# Patient Record
Sex: FEMALE | Race: WHITE | NOT HISPANIC OR LATINO | Employment: OTHER | URBAN - METROPOLITAN AREA
[De-identification: names, ages, dates, MRNs, and addresses within clinical notes are randomized per-mention and may not be internally consistent; named-entity substitution may affect disease eponyms.]

---

## 2023-06-01 ENCOUNTER — TELEPHONE (OUTPATIENT)
Dept: PRIMARY CARE | Facility: CLINIC | Age: 78
End: 2023-06-01
Payer: MEDICARE

## 2023-06-01 NOTE — TELEPHONE ENCOUNTER
Pt called and c/o back of leg hurting and her dtr is a er nurse and told her it could be a blood clot and that she needed a mri. I advised pt to got to ER for evaluation and the er md would order neccessary testing.

## 2023-06-02 ENCOUNTER — TELEPHONE (OUTPATIENT)
Dept: PRIMARY CARE | Facility: CLINIC | Age: 78
End: 2023-06-02
Payer: MEDICARE

## 2023-06-02 NOTE — TELEPHONE ENCOUNTER
Patient was in [facility] [place] yesterday and told to get a 1 week follow up ultrasound of her leg. So she needs an order. Please advise.

## 2023-06-06 ENCOUNTER — OFFICE VISIT (OUTPATIENT)
Dept: PRIMARY CARE | Facility: CLINIC | Age: 78
End: 2023-06-06
Payer: MEDICARE

## 2023-06-06 ENCOUNTER — TELEPHONE (OUTPATIENT)
Dept: PRIMARY CARE | Facility: CLINIC | Age: 78
End: 2023-06-06

## 2023-06-06 VITALS
SYSTOLIC BLOOD PRESSURE: 174 MMHG | TEMPERATURE: 97.9 F | DIASTOLIC BLOOD PRESSURE: 98 MMHG | RESPIRATION RATE: 16 BRPM | HEART RATE: 68 BPM | BODY MASS INDEX: 33.2 KG/M2 | WEIGHT: 170 LBS

## 2023-06-06 DIAGNOSIS — E78.00 HYPERCHOLESTEROLEMIA: ICD-10-CM

## 2023-06-06 DIAGNOSIS — M46.1 SACROILIITIS, NOT ELSEWHERE CLASSIFIED (CMS-HCC): Primary | ICD-10-CM

## 2023-06-06 DIAGNOSIS — I80.01 THROMBOPHLEBITIS OF SUPERFICIAL VEINS OF RIGHT LOWER EXTREMITY: ICD-10-CM

## 2023-06-06 DIAGNOSIS — I10 HYPERTENSION, UNSPECIFIED TYPE: ICD-10-CM

## 2023-06-06 PROBLEM — M51.369 DEGENERATIVE DISC DISEASE, LUMBAR: Status: ACTIVE | Noted: 2023-06-06

## 2023-06-06 PROBLEM — M51.36 DEGENERATIVE DISC DISEASE, LUMBAR: Status: ACTIVE | Noted: 2023-06-06

## 2023-06-06 PROBLEM — J30.2 SEASONAL ALLERGIC RHINITIS: Status: ACTIVE | Noted: 2023-06-06

## 2023-06-06 PROBLEM — M48.061 LUMBAR SPINAL STENOSIS: Status: ACTIVE | Noted: 2023-06-06

## 2023-06-06 PROBLEM — I25.10 ARTERIOSCLEROSIS OF CORONARY ARTERY: Status: ACTIVE | Noted: 2023-06-06

## 2023-06-06 PROBLEM — I83.819 VARICOSE VEINS WITH PAIN: Status: ACTIVE | Noted: 2023-06-06

## 2023-06-06 PROBLEM — I82.811 CHRONIC SUPERFICIAL VENOUS THROMBOSIS OF RIGHT LOWER EXTREMITY: Status: ACTIVE | Noted: 2023-06-01

## 2023-06-06 PROBLEM — I44.7 LBBB (LEFT BUNDLE BRANCH BLOCK): Status: ACTIVE | Noted: 2023-06-06

## 2023-06-06 PROBLEM — J45.909 REACTIVE AIRWAY DISEASE (HHS-HCC): Status: ACTIVE | Noted: 2023-06-06

## 2023-06-06 PROBLEM — K21.9 GASTROESOPHAGEAL REFLUX DISEASE: Status: ACTIVE | Noted: 2023-06-06

## 2023-06-06 PROCEDURE — 1159F MED LIST DOCD IN RCRD: CPT | Performed by: INTERNAL MEDICINE

## 2023-06-06 PROCEDURE — 3080F DIAST BP >= 90 MM HG: CPT | Performed by: INTERNAL MEDICINE

## 2023-06-06 PROCEDURE — 1160F RVW MEDS BY RX/DR IN RCRD: CPT | Performed by: INTERNAL MEDICINE

## 2023-06-06 PROCEDURE — 1036F TOBACCO NON-USER: CPT | Performed by: INTERNAL MEDICINE

## 2023-06-06 PROCEDURE — 3077F SYST BP >= 140 MM HG: CPT | Performed by: INTERNAL MEDICINE

## 2023-06-06 PROCEDURE — 99214 OFFICE O/P EST MOD 30 MIN: CPT | Performed by: INTERNAL MEDICINE

## 2023-06-06 RX ORDER — FLUTICASONE PROPIONATE AND SALMETEROL 100; 50 UG/1; UG/1
1 POWDER RESPIRATORY (INHALATION)
COMMUNITY
Start: 2019-03-29 | End: 2023-08-24 | Stop reason: ALTCHOICE

## 2023-06-06 RX ORDER — ATENOLOL 25 MG/1
25 TABLET ORAL DAILY
COMMUNITY
Start: 2017-01-20

## 2023-06-06 RX ORDER — POTASSIUM CHLORIDE 20 MEQ/1
20 TABLET, EXTENDED RELEASE ORAL DAILY
Qty: 90 TABLET | Refills: 0 | Status: SHIPPED | OUTPATIENT
Start: 2023-06-06 | End: 2023-08-24 | Stop reason: SDUPTHER

## 2023-06-06 RX ORDER — LORATADINE 10 MG/1
10 TABLET ORAL DAILY
COMMUNITY
Start: 2017-01-20

## 2023-06-06 RX ORDER — CHOLECALCIFEROL (VITAMIN D3) 125 MCG
125 CAPSULE ORAL DAILY
COMMUNITY

## 2023-06-06 RX ORDER — POTASSIUM CHLORIDE 1500 MG/1
1 TABLET, EXTENDED RELEASE ORAL DAILY
COMMUNITY
Start: 2012-11-18 | End: 2023-06-06 | Stop reason: SDUPTHER

## 2023-06-06 RX ORDER — HYDROGEN PEROXIDE 3 %
40 SOLUTION, NON-ORAL MISCELLANEOUS
COMMUNITY

## 2023-06-06 RX ORDER — CALCIUM CARBONATE 500(1250)
1 TABLET ORAL
COMMUNITY

## 2023-06-06 RX ORDER — FUROSEMIDE 80 MG/1
40 TABLET ORAL DAILY
COMMUNITY
Start: 2017-01-20 | End: 2024-01-31 | Stop reason: ALTCHOICE

## 2023-06-06 RX ORDER — LOSARTAN POTASSIUM 25 MG/1
1 TABLET ORAL DAILY
COMMUNITY
Start: 2013-05-01

## 2023-06-06 RX ORDER — SIMVASTATIN 20 MG/1
1 TABLET, FILM COATED ORAL NIGHTLY
COMMUNITY
Start: 2013-05-28

## 2023-06-06 RX ORDER — AZELASTINE 1 MG/ML
SPRAY, METERED NASAL 2 TIMES DAILY
COMMUNITY
Start: 2022-02-25 | End: 2023-10-10 | Stop reason: SDUPTHER

## 2023-06-06 RX ORDER — FLUTICASONE PROPIONATE AND SALMETEROL 100; 50 UG/1; UG/1
POWDER RESPIRATORY (INHALATION)
COMMUNITY
Start: 2017-05-10 | End: 2023-08-24 | Stop reason: ALTCHOICE

## 2023-06-06 RX ORDER — GABAPENTIN 100 MG/1
300 CAPSULE ORAL 2 TIMES DAILY
COMMUNITY
Start: 2013-05-09 | End: 2024-01-24 | Stop reason: SDUPTHER

## 2023-06-06 RX ORDER — ECHINACEA PURPUREA EXTRACT 125 MG
TABLET ORAL
COMMUNITY

## 2023-06-06 RX ORDER — OMEGA-3-ACID ETHYL ESTERS 1 G/1
1 CAPSULE, LIQUID FILLED ORAL 2 TIMES DAILY
COMMUNITY

## 2023-06-06 RX ORDER — BUTYROSPERMUM PARKII(SHEA BUTTER), SIMMONDSIA CHINENSIS (JOJOBA) SEED OIL, ALOE BARBADENSIS LEAF EXTRACT .01; 1; 3.5 G/100G; G/100G; G/100G
250 LIQUID TOPICAL 2 TIMES DAILY
COMMUNITY
End: 2023-11-15 | Stop reason: ALTCHOICE

## 2023-06-07 NOTE — TELEPHONE ENCOUNTER
Vascular lab called to report scan was done on leg and there is no changes.  She has a superficial blot.

## 2023-06-09 NOTE — TELEPHONE ENCOUNTER
Spoke to pt and aware of preliminary report and to call for ortho referral if pain continues.  josias

## 2023-06-12 DIAGNOSIS — M80.00XA OSTEOPOROSIS WITH CURRENT PATHOLOGICAL FRACTURE, UNSPECIFIED OSTEOPOROSIS TYPE, INITIAL ENCOUNTER: ICD-10-CM

## 2023-08-16 LAB
NON-UH HIE CALCULATED LDL CHOLESTEROL: 73 MG/DL (ref 60–130)
NON-UH HIE CHOLESTEROL: 170 MG/DL (ref 100–200)
NON-UH HIE HDL CHOLESTEROL: 60 MG/DL (ref 40–60)
NON-UH HIE TOTAL CHOL/HDL CHOL RATIO: 2.8
NON-UH HIE TRIGLYCERIDES: 185 MG/DL (ref 30–150)

## 2023-08-21 ENCOUNTER — APPOINTMENT (OUTPATIENT)
Dept: PRIMARY CARE | Facility: CLINIC | Age: 78
End: 2023-08-21
Payer: MEDICARE

## 2023-08-23 ENCOUNTER — TELEPHONE (OUTPATIENT)
Dept: PRIMARY CARE | Facility: CLINIC | Age: 78
End: 2023-08-23
Payer: MEDICARE

## 2023-08-24 ENCOUNTER — OFFICE VISIT (OUTPATIENT)
Dept: PRIMARY CARE | Facility: CLINIC | Age: 78
End: 2023-08-24
Payer: MEDICARE

## 2023-08-24 VITALS
WEIGHT: 168.5 LBS | HEIGHT: 60 IN | TEMPERATURE: 98.8 F | BODY MASS INDEX: 33.08 KG/M2 | RESPIRATION RATE: 16 BRPM | HEART RATE: 68 BPM | DIASTOLIC BLOOD PRESSURE: 70 MMHG | SYSTOLIC BLOOD PRESSURE: 130 MMHG

## 2023-08-24 DIAGNOSIS — B37.9 CANDIDIASIS: ICD-10-CM

## 2023-08-24 DIAGNOSIS — I25.10 ARTERIOSCLEROSIS OF CORONARY ARTERY: ICD-10-CM

## 2023-08-24 DIAGNOSIS — I10 PRIMARY HYPERTENSION: Primary | ICD-10-CM

## 2023-08-24 DIAGNOSIS — M46.1 SACROILIITIS, NOT ELSEWHERE CLASSIFIED (CMS-HCC): ICD-10-CM

## 2023-08-24 DIAGNOSIS — I70.0 ATHEROSCLEROSIS OF AORTA (CMS-HCC): ICD-10-CM

## 2023-08-24 DIAGNOSIS — E78.00 HYPERCHOLESTEROLEMIA: ICD-10-CM

## 2023-08-24 DIAGNOSIS — I10 BENIGN ESSENTIAL HYPERTENSION: ICD-10-CM

## 2023-08-24 DIAGNOSIS — I10 HYPERTENSION, UNSPECIFIED TYPE: ICD-10-CM

## 2023-08-24 PROCEDURE — 3078F DIAST BP <80 MM HG: CPT | Performed by: INTERNAL MEDICINE

## 2023-08-24 PROCEDURE — 3075F SYST BP GE 130 - 139MM HG: CPT | Performed by: INTERNAL MEDICINE

## 2023-08-24 PROCEDURE — 1036F TOBACCO NON-USER: CPT | Performed by: INTERNAL MEDICINE

## 2023-08-24 PROCEDURE — 1159F MED LIST DOCD IN RCRD: CPT | Performed by: INTERNAL MEDICINE

## 2023-08-24 PROCEDURE — 99213 OFFICE O/P EST LOW 20 MIN: CPT | Performed by: INTERNAL MEDICINE

## 2023-08-24 PROCEDURE — 1160F RVW MEDS BY RX/DR IN RCRD: CPT | Performed by: INTERNAL MEDICINE

## 2023-08-24 RX ORDER — BENZONATATE 200 MG/1
200 CAPSULE ORAL 3 TIMES DAILY PRN
COMMUNITY
End: 2024-01-31 | Stop reason: WASHOUT

## 2023-08-24 RX ORDER — NYSTATIN 100000 [USP'U]/G
1 POWDER TOPICAL 2 TIMES DAILY
COMMUNITY
Start: 2018-06-05 | End: 2023-08-24 | Stop reason: SDUPTHER

## 2023-08-24 RX ORDER — CELECOXIB 200 MG/1
200 CAPSULE ORAL DAILY
Qty: 90 CAPSULE | Refills: 0 | Status: SHIPPED | OUTPATIENT
Start: 2023-08-24 | End: 2024-01-31

## 2023-08-24 RX ORDER — CELECOXIB 200 MG/1
1 CAPSULE ORAL DAILY
COMMUNITY
Start: 2015-02-23 | End: 2023-08-24 | Stop reason: SDUPTHER

## 2023-08-24 RX ORDER — POTASSIUM CHLORIDE 20 MEQ/1
20 TABLET, EXTENDED RELEASE ORAL DAILY
Qty: 90 TABLET | Refills: 0 | Status: SHIPPED | OUTPATIENT
Start: 2023-08-24 | End: 2023-12-20 | Stop reason: SDUPTHER

## 2023-08-24 RX ORDER — CLOTRIMAZOLE AND BETAMETHASONE DIPROPIONATE 10; .64 MG/G; MG/G
1 CREAM TOPICAL 2 TIMES DAILY
COMMUNITY
Start: 2017-09-29 | End: 2023-08-24 | Stop reason: SDUPTHER

## 2023-08-24 RX ORDER — FLUTICASONE PROPIONATE AND SALMETEROL 250; 50 UG/1; UG/1
1 POWDER RESPIRATORY (INHALATION) EVERY 12 HOURS
COMMUNITY
Start: 2022-11-03 | End: 2023-10-11 | Stop reason: SDUPTHER

## 2023-08-24 RX ORDER — CLOTRIMAZOLE AND BETAMETHASONE DIPROPIONATE 10; .64 MG/G; MG/G
1 CREAM TOPICAL 2 TIMES DAILY
Qty: 45 G | Refills: 1 | Status: SHIPPED | OUTPATIENT
Start: 2023-08-24

## 2023-08-24 RX ORDER — NYSTATIN 100000 [USP'U]/G
1 POWDER TOPICAL 2 TIMES DAILY
Qty: 60 G | Refills: 1 | Status: SHIPPED | OUTPATIENT
Start: 2023-08-24 | End: 2024-05-23 | Stop reason: SDUPTHER

## 2023-08-24 ASSESSMENT — COLUMBIA-SUICIDE SEVERITY RATING SCALE - C-SSRS
6. HAVE YOU EVER DONE ANYTHING, STARTED TO DO ANYTHING, OR PREPARED TO DO ANYTHING TO END YOUR LIFE?: NO
2. HAVE YOU ACTUALLY HAD ANY THOUGHTS OF KILLING YOURSELF?: NO

## 2023-08-24 ASSESSMENT — PATIENT HEALTH QUESTIONNAIRE - PHQ9
SUM OF ALL RESPONSES TO PHQ9 QUESTIONS 1 AND 2: 0
1. LITTLE INTEREST OR PLEASURE IN DOING THINGS: NOT AT ALL
2. FEELING DOWN, DEPRESSED OR HOPELESS: NOT AT ALL

## 2023-08-24 NOTE — PROGRESS NOTES
Subjective   Patient ID: Nevin Rodney is a 78 y.o. female who presents for Follow-up (cholesterol).  HPI  Patient presents presents today for follow-up of hypertension hyperlipidemia.  She sees cardiology as well for hypertension.  Her last visit her blood pressure was high today is better.  She states when she was at pain management was quite high as well.  She sees Dr. Chun.  She maintains on her routine medications and needs refills on her Celebrex her potassium and her creams for yeast infections.  Patient was in a few months call with a superficial phlebitis posterior to her knee.  She has a little bit of residual hardness in that area but no pain she also has a sore mole on her left shoulder comes and goes today its not sore we examined it nothing is tender so I cannot tell which one is hurting.    Patient denies chest pains headaches dizziness lightheadedness shortness of breath she does not have any change in her lower extremity edema she does get a Lasix prescription from Dr. Chun and she is on gabapentin from her pain management doctor that she sees for degenerative disc disease of her lumbar spine.  Dr. Chun also refills her cholesterol medication.    Review of Systems  Review of systems was performed and is otherwise negative except as noted in HPI.  Objective   /70   Pulse 68   Temp 37.1 °C (98.8 °F)   Resp 16   Ht 1.524 m (5')   Wt 76.4 kg (168 lb 8 oz)   BMI 32.91 kg/m²    Physical Exam  HEENT is normal  Lungs clear bilaterally  Heart is regular rate and rhythm no murmurs  Abdomen benign  Lower extremities no edema  Assessment/Plan   Diagnoses and all orders for this visit:  Primary hypertension  Atherosclerosis of aorta (CMS/HCC)  Arteriosclerosis of coronary artery  Benign essential hypertension  -     Comprehensive Metabolic Panel; Future  -     Lipid Panel; Future  Hypercholesterolemia  -     Comprehensive Metabolic Panel; Future  -     Lipid Panel; Future  Hypertension, unspecified  type  -     potassium chloride CR (Klor-Con M20) 20 mEq ER tablet; Take 1 tablet (20 mEq) by mouth once daily.  Sacroiliitis, not elsewhere classified (CMS/HCC)  -     celecoxib (CeleBREX) 200 mg capsule; Take 1 capsule (200 mg) by mouth once daily.  Candidiasis  -     clotrimazole-betamethasone (Lotrisone) cream; Apply 1 Application topically 2 times a day.  -     nystatin (Mycostatin) 100,000 unit/gram powder; Apply 1 Application topically 2 times a day.    Call with issues  Follow-up with me in 3 months  Work orders done  They will need a lipid panel at this office at this time sure why is she had a CMP order is all however on her CMP in June  So we will just repeat that in November at her next follow-up appointment.  She will call if she has any issues in the meantime refills were sent  Zakiya Burr MD

## 2023-09-15 ENCOUNTER — OFFICE VISIT (OUTPATIENT)
Dept: PRIMARY CARE | Facility: CLINIC | Age: 78
End: 2023-09-15
Payer: MEDICARE

## 2023-09-15 VITALS
TEMPERATURE: 98.3 F | WEIGHT: 165 LBS | SYSTOLIC BLOOD PRESSURE: 132 MMHG | DIASTOLIC BLOOD PRESSURE: 74 MMHG | BODY MASS INDEX: 32.22 KG/M2

## 2023-09-15 DIAGNOSIS — H92.01 DISCOMFORT OF RIGHT EAR: Primary | ICD-10-CM

## 2023-09-15 DIAGNOSIS — Z23 FLU VACCINE NEED: ICD-10-CM

## 2023-09-15 PROCEDURE — 1160F RVW MEDS BY RX/DR IN RCRD: CPT | Performed by: FAMILY MEDICINE

## 2023-09-15 PROCEDURE — 3075F SYST BP GE 130 - 139MM HG: CPT | Performed by: FAMILY MEDICINE

## 2023-09-15 PROCEDURE — G0008 ADMIN INFLUENZA VIRUS VAC: HCPCS | Performed by: FAMILY MEDICINE

## 2023-09-15 PROCEDURE — 90662 IIV NO PRSV INCREASED AG IM: CPT | Performed by: FAMILY MEDICINE

## 2023-09-15 PROCEDURE — 3078F DIAST BP <80 MM HG: CPT | Performed by: FAMILY MEDICINE

## 2023-09-15 PROCEDURE — 99213 OFFICE O/P EST LOW 20 MIN: CPT | Performed by: FAMILY MEDICINE

## 2023-09-15 PROCEDURE — 1036F TOBACCO NON-USER: CPT | Performed by: FAMILY MEDICINE

## 2023-09-15 PROCEDURE — 1159F MED LIST DOCD IN RCRD: CPT | Performed by: FAMILY MEDICINE

## 2023-09-15 NOTE — PROGRESS NOTES
Subjective   Patient ID: Nevin Rodney is a 78 y.o. female who presents for right ear pressure.  HPI  Started a few days ago- right ear pressure, not really pain  H/o asthma- sees Dr. Turner  Takes Claritin daily     Objective   Visit Vitals  /74   Temp 36.8 °C (98.3 °F) (Oral)      Physical Exam  Alert, well-appearing.    HEENT: OP clear. Sclera white. Pupils equal and round. EACs and TMs clear.    Neck: Supple. No palpable masses.     CVS: RRR, no murmurs.     Respiratory: Normal inspirations and expirations. Clear and equal breath sounds.    Assessment/Plan   Diagnoses and all orders for this visit:  Discomfort of right ear    Likely eustacian tube dysfunction  Rec she try Flonase  Call if continued issues     Kristen Velasquez MD  Family Medicine   [facility]

## 2023-10-10 DIAGNOSIS — J30.2 SEASONAL ALLERGIC RHINITIS, UNSPECIFIED TRIGGER: Primary | ICD-10-CM

## 2023-10-10 RX ORDER — AZELASTINE 1 MG/ML
2 SPRAY, METERED NASAL 2 TIMES DAILY
Qty: 72 ML | Refills: 0 | Status: SHIPPED | OUTPATIENT
Start: 2023-10-10 | End: 2023-12-01 | Stop reason: ALTCHOICE

## 2023-10-10 RX ORDER — AZELASTINE 1 MG/ML
1 SPRAY, METERED NASAL 2 TIMES DAILY
Qty: 30 ML | Refills: 0 | Status: CANCELLED | OUTPATIENT
Start: 2023-10-10

## 2023-10-11 DIAGNOSIS — J45.909 ASTHMA, UNSPECIFIED ASTHMA SEVERITY, UNSPECIFIED WHETHER COMPLICATED, UNSPECIFIED WHETHER PERSISTENT (HHS-HCC): Primary | ICD-10-CM

## 2023-10-12 RX ORDER — FLUTICASONE PROPIONATE AND SALMETEROL 250; 50 UG/1; UG/1
1 POWDER RESPIRATORY (INHALATION) EVERY 12 HOURS
Qty: 3 EACH | Refills: 3 | Status: SHIPPED | OUTPATIENT
Start: 2023-10-12 | End: 2024-01-10

## 2023-10-16 LAB
NON-UH HIE A/G RATIO: 1.2
NON-UH HIE ALB: 3.4 G/DL (ref 3.4–5)
NON-UH HIE ALK PHOS: 70 UNIT/L (ref 46–116)
NON-UH HIE BILIRUBIN, TOTAL: 0.6 MG/DL (ref 0.2–1)
NON-UH HIE BUN/CREAT RATIO: 11.2
NON-UH HIE BUN: 9 MG/DL (ref 9–23)
NON-UH HIE CALCIUM: 10.1 MG/DL (ref 8.7–10.4)
NON-UH HIE CALCULATED LDL CHOLESTEROL: 67 MG/DL (ref 60–130)
NON-UH HIE CALCULATED OSMOLALITY: 284 MOSM/KG (ref 275–295)
NON-UH HIE CHLORIDE: 104 MMOL/L (ref 98–107)
NON-UH HIE CHOLESTEROL: 171 MG/DL (ref 100–200)
NON-UH HIE CO2, VENOUS: 33 MMOL/L (ref 20–31)
NON-UH HIE CREATININE: 0.8 MG/DL (ref 0.5–0.8)
NON-UH HIE GFR AA: >60
NON-UH HIE GLOBULIN: 2.8 G/DL
NON-UH HIE GLOMERULAR FILTRATION RATE: >60 ML/MIN/1.73M?
NON-UH HIE GLUCOSE: 100 MG/DL (ref 74–106)
NON-UH HIE GOT: 22 UNIT/L (ref 15–37)
NON-UH HIE GPT: 19 UNIT/L (ref 10–49)
NON-UH HIE HDL CHOLESTEROL: 68 MG/DL (ref 40–60)
NON-UH HIE K: 4.6 MMOL/L (ref 3.5–5.1)
NON-UH HIE NA: 143 MMOL/L (ref 135–145)
NON-UH HIE TOTAL CHOL/HDL CHOL RATIO: 2.5
NON-UH HIE TOTAL PROTEIN: 6.2 G/DL (ref 5.7–8.2)
NON-UH HIE TRIGLYCERIDES: 178 MG/DL (ref 30–150)

## 2023-11-10 DIAGNOSIS — Z12.11 COLON CANCER SCREENING: ICD-10-CM

## 2023-11-14 ASSESSMENT — ENCOUNTER SYMPTOMS
SINUS PRESSURE: 0
AGITATION: 0
DYSPHORIC MOOD: 0
SHORTNESS OF BREATH: 0
RHINORRHEA: 0
STRIDOR: 0
SORE THROAT: 0
CHEST TIGHTNESS: 0
CONFUSION: 0
SLEEP DISTURBANCE: 0
WHEEZING: 0
CONSTITUTIONAL NEGATIVE: 1
COUGH: 0
PALPITATIONS: 0
SINUS PAIN: 0

## 2023-11-15 ENCOUNTER — OFFICE VISIT (OUTPATIENT)
Dept: ALLERGY | Facility: CLINIC | Age: 78
End: 2023-11-15
Payer: MEDICARE

## 2023-11-15 VITALS — WEIGHT: 168 LBS | BODY MASS INDEX: 32.81 KG/M2 | OXYGEN SATURATION: 96 %

## 2023-11-15 DIAGNOSIS — J45.20 MILD INTERMITTENT REACTIVE AIRWAY DISEASE WITHOUT COMPLICATION (HHS-HCC): ICD-10-CM

## 2023-11-15 DIAGNOSIS — J45.909 ASTHMA, UNSPECIFIED ASTHMA SEVERITY, UNSPECIFIED WHETHER COMPLICATED, UNSPECIFIED WHETHER PERSISTENT (HHS-HCC): ICD-10-CM

## 2023-11-15 DIAGNOSIS — J30.2 SEASONAL ALLERGIC RHINITIS DUE TO FUNGAL SPORES: Primary | ICD-10-CM

## 2023-11-15 PROCEDURE — 94375 RESPIRATORY FLOW VOLUME LOOP: CPT | Performed by: ALLERGY & IMMUNOLOGY

## 2023-11-15 PROCEDURE — 96160 PT-FOCUSED HLTH RISK ASSMT: CPT | Performed by: ALLERGY & IMMUNOLOGY

## 2023-11-15 PROCEDURE — 1159F MED LIST DOCD IN RCRD: CPT | Performed by: ALLERGY & IMMUNOLOGY

## 2023-11-15 PROCEDURE — 1036F TOBACCO NON-USER: CPT | Performed by: ALLERGY & IMMUNOLOGY

## 2023-11-15 PROCEDURE — 99214 OFFICE O/P EST MOD 30 MIN: CPT | Performed by: ALLERGY & IMMUNOLOGY

## 2023-11-15 PROCEDURE — 1160F RVW MEDS BY RX/DR IN RCRD: CPT | Performed by: ALLERGY & IMMUNOLOGY

## 2023-11-15 RX ORDER — ALBUTEROL SULFATE 90 UG/1
1-2 AEROSOL, METERED RESPIRATORY (INHALATION) EVERY 4 HOURS PRN
Qty: 8.5 G | Refills: 0 | Status: SHIPPED | OUTPATIENT
Start: 2023-11-15 | End: 2024-11-14

## 2023-11-15 RX ORDER — FLUTICASONE PROPIONATE 50 MCG
2 SPRAY, SUSPENSION (ML) NASAL DAILY PRN
COMMUNITY

## 2023-11-15 ASSESSMENT — ENCOUNTER SYMPTOMS: ROS GI COMMENTS: REFLUX.

## 2023-11-15 NOTE — PROGRESS NOTES
"    Subjective   Patient ID:   [id number]   Kaity Rodney \"Nevin\" is a 78 y.o. female who presents for Allergies and Asthma.    Chief Complaint   Patient presents with    Allergies    Asthma        Since last visit, 08/10/2023, patient reports she has been well overall.  She noticed cough resolved after stopping Spiriva.  She uses her inhaler BID every day.  She has not used albuterol.  She has azelastine and uses Flonase PRN.     Patient provided a sputum culture that was negative.      Patient received her RSV vaccine and then her influenza vaccines.  She is not going to have the COVID because despite 5 vaccines, she had COVID.    Patient does endorse heartburn more than 3x a week but takes no medication for it. (Med list shows esomeprazole)     Review of Systems   Constitutional: Negative.    HENT:  Negative for ear discharge, ear pain, rhinorrhea, sinus pressure, sinus pain, sneezing and sore throat.    Respiratory:  Negative for cough, chest tightness, shortness of breath, wheezing and stridor.    Cardiovascular:  Negative for chest pain, palpitations and leg swelling.   Gastrointestinal:         Reflux.   Skin:  Negative for rash.   Allergic/Immunologic: Positive for environmental allergies. Negative for food allergies and immunocompromised state.   Psychiatric/Behavioral:  Negative for agitation, confusion, dysphoric mood and sleep disturbance.          Objective     Wt 76.2 kg (168 lb)   SpO2 96%   BMI 32.81 kg/m²      Physical Exam  Constitutional:       Appearance: Normal appearance.   HENT:      Head: Normocephalic and atraumatic.      Right Ear: External ear normal. There is no impacted cerumen.      Left Ear: External ear normal. There is no impacted cerumen.      Nose: Congestion present. No rhinorrhea.   Eyes:      Extraocular Movements: Extraocular movements intact.      Conjunctiva/sclera: Conjunctivae normal.      Pupils: Pupils are equal, round, and reactive to light.   Cardiovascular:      " ANTICOAGULATION MANAGEMENT      Salome Saeed due for annual renewal of referral to anticoagulation monitoring. Order pended for your review and signature.      ANTICOAGULATION SUMMARY      Warfarin indication(s)     CVA    Heart valve present?  NO       Current goal range   INR: 2.0-3.0     Goal appropriate for indication? Yes, INR 2-3 appropriate for hx of DVT, PE, hypercoagulable state, Afib, LVAD, or bileaflet AVR without risk factors     Current duration of therapy Indefinite/long term therapy   Time in Therapeutic Range (TTR)  (Goal > 60%) 79.7%       Office visit with referring provider's group within last year yes on 4/9/21       Neel Winn RN       Rate and Rhythm: Normal rate and regular rhythm.      Heart sounds: No murmur heard.     No friction rub. No gallop.   Pulmonary:      Effort: No respiratory distress.      Breath sounds: No wheezing, rhonchi or rales.   Skin:     General: Skin is warm and dry.   Neurological:      Mental Status: She is alert.   Psychiatric:         Mood and Affect: Mood normal.         Behavior: Behavior normal.       FVC:   89    FEV1:   104    FEV1/FVC:  116    FEF 25-75:    167    ACT is 16.       Current Outpatient Medications   Medication Sig Dispense Refill    atenolol (Tenormin) 25 mg tablet 1 tablet (25 mg) once daily.      azelastine (Astelin) 137 mcg (0.1 %) nasal spray Administer 2 sprays into each nostril 2 times a day. 72 mL 0    benzonatate (Tessalon) 200 mg capsule Take 1 capsule (200 mg) by mouth 3 times a day as needed for cough. Do not crush or chew.      calcium 500 mg calcium (1,250 mg) tablet Take 1 tablet (1,250 mg) by mouth 2 times a day with meals.      celecoxib (CeleBREX) 200 mg capsule Take 1 capsule (200 mg) by mouth once daily. 90 capsule 0    cholecalciferol (Vitamin D-3) 125 MCG (5000 UT) capsule Take 1 capsule (125 mcg) by mouth once daily.      clotrimazole-betamethasone (Lotrisone) cream Apply 1 Application topically 2 times a day. 45 g 1    Echinacea purpurea extract (echinacea) 125 mg tablet Take by mouth.      esomeprazole (NexIUM) 20 mg DR capsule Take 2 capsules (40 mg) by mouth once daily in the morning. Take before meals. Do not open capsule.      fluticasone propion-salmeteroL (Advair Diskus) 250-50 mcg/dose diskus inhaler Inhale 1 puff every 12 hours. 3 each 3    furosemide (Lasix) 80 mg tablet 0.5 tablets (40 mg) once daily.      gabapentin (Neurontin) 100 mg capsule Take 3 capsules (300 mg) by mouth 2 times a day.      loratadine (Claritin) 10 mg tablet Take 1 tablet (10 mg) by mouth once daily.      losartan (Cozaar) 25 mg tablet Take 1 tablet (25 mg) by mouth once daily.      nystatin  (Mycostatin) 100,000 unit/gram powder Apply 1 Application topically 2 times a day. 60 g 1    omega-3 acid ethyl esters (Lovaza) 1 gram capsule Take 1 capsule (1 g) by mouth 2 times a day.      potassium chloride CR (Klor-Con M20) 20 mEq ER tablet Take 1 tablet (20 mEq) by mouth once daily. 90 tablet 0    simvastatin (Zocor) 20 mg tablet Take 1 tablet (20 mg) by mouth once daily at bedtime.      albuterol (ProAir HFA) 90 mcg/actuation inhaler Inhale 1-2 puffs every 4 hours if needed for wheezing or shortness of breath (cough). 8.5 g 0    fluticasone (Flonase) 50 mcg/actuation nasal spray Administer 2 sprays into each nostril once daily as needed for rhinitis (ear congestion). Shake gently. Before first use, prime pump. After use, clean tip and replace cap.       No current facility-administered medications for this visit.         Orders Placed This Encounter   Procedures    Pulmonary function testing     Order Specific Question:   Reason for Exam:     Answer:   asthma     Order Specific Question:   Testing Type:     Answer:   Spirometry     Order Specific Question:   Liters of Oxygen per minute:     Answer:   N/A          Assessment/Plan         Asthma  ACT is 16.  IO PFT looks great today.  She will use albuterol as needed and continue Advair.       By signing my name below, I, Mandeep Garcia, attest that this documentation has been prepared under the direction and in the presence of Evelyn Turner MD.   All medical record entries made by the Scribe were at my direction and personally dictated by me. I have reviewed the chart and agree that the record accurately reflects my personal performance of the history, physical exam, discussion and plan.

## 2023-11-20 LAB
FEV1/FVC: 116 %
FEV1: 104 LITERS
FVC: 89 LITERS

## 2023-12-01 ENCOUNTER — OFFICE VISIT (OUTPATIENT)
Dept: PRIMARY CARE | Facility: CLINIC | Age: 78
End: 2023-12-01
Payer: MEDICARE

## 2023-12-01 VITALS
BODY MASS INDEX: 32.79 KG/M2 | SYSTOLIC BLOOD PRESSURE: 132 MMHG | WEIGHT: 167 LBS | OXYGEN SATURATION: 94 % | HEIGHT: 60 IN | HEART RATE: 71 BPM | TEMPERATURE: 98 F | DIASTOLIC BLOOD PRESSURE: 76 MMHG

## 2023-12-01 DIAGNOSIS — I10 PRIMARY HYPERTENSION: ICD-10-CM

## 2023-12-01 DIAGNOSIS — E78.00 HYPERCHOLESTEROLEMIA: ICD-10-CM

## 2023-12-01 DIAGNOSIS — I25.10 ARTERIOSCLEROSIS OF CORONARY ARTERY: ICD-10-CM

## 2023-12-01 DIAGNOSIS — I10 BENIGN ESSENTIAL HYPERTENSION: Primary | ICD-10-CM

## 2023-12-01 DIAGNOSIS — J45.20 MILD INTERMITTENT ASTHMA WITHOUT COMPLICATION (HHS-HCC): ICD-10-CM

## 2023-12-01 PROCEDURE — 1160F RVW MEDS BY RX/DR IN RCRD: CPT | Performed by: INTERNAL MEDICINE

## 2023-12-01 PROCEDURE — 3075F SYST BP GE 130 - 139MM HG: CPT | Performed by: INTERNAL MEDICINE

## 2023-12-01 PROCEDURE — 3078F DIAST BP <80 MM HG: CPT | Performed by: INTERNAL MEDICINE

## 2023-12-01 PROCEDURE — 1159F MED LIST DOCD IN RCRD: CPT | Performed by: INTERNAL MEDICINE

## 2023-12-01 PROCEDURE — 99213 OFFICE O/P EST LOW 20 MIN: CPT | Performed by: INTERNAL MEDICINE

## 2023-12-01 PROCEDURE — 1036F TOBACCO NON-USER: CPT | Performed by: INTERNAL MEDICINE

## 2023-12-01 RX ORDER — AZELASTINE 1 MG/ML
1 SPRAY, METERED NASAL 2 TIMES DAILY
COMMUNITY
End: 2024-01-02 | Stop reason: SDUPTHER

## 2023-12-01 ASSESSMENT — PATIENT HEALTH QUESTIONNAIRE - PHQ9
2. FEELING DOWN, DEPRESSED OR HOPELESS: NOT AT ALL
1. LITTLE INTEREST OR PLEASURE IN DOING THINGS: NOT AT ALL
SUM OF ALL RESPONSES TO PHQ9 QUESTIONS 1 AND 2: 0

## 2023-12-01 NOTE — PROGRESS NOTES
"Subjective   Patient ID: Kaity Rodney \"Nevin\" is a 78 y.o. female who presents for Follow-up (EP.  Follow up.  Labs done in October.  No concerns.).  HPI  Patient presents today for routine follow-up.  We reviewed her medication list and it is accurate as noted.  She still sees her cardiologist  and she is currently seeing pain management due to a flare of her sciatica asked after her recent river cruise.  She is getting some injections next week.  Patient denies chest pains headaches dizziness lightheadedness or shortness of breath she does not have any lower extremity edema.  She has a chronic cough that is treated by her allergist and asthma doctor.  She was recently given albuterol to use as needed.  She uses Advair.  She uses Tessalon Perles but they do not really help she is occasional cough syrup.  She uses azelastine nasal spray as well as Flonase.  She continues on her routine pain medications.  She is on her routine blood pressure medications she had a CMP and lipid panel done which we reviewed here in the office today.  She will be due for Medicare wellness exam in 6 months.  Patient does not have any other issues currently she does try to get her back pain under control so she can get more exercise.    Review of Systems  Review of systems was performed and is otherwise negative except as noted in HPI.  Objective   /76   Pulse 71   Temp 36.7 °C (98 °F) (Oral)   Ht 1.524 m (5')   Wt 75.8 kg (167 lb)   SpO2 94%   BMI 32.61 kg/m²    Physical Exam  HEENT is normal  Lungs clear bilaterally  Heart is regular rate rhythm no murmurs  Abdomen benign  Lower extremities no edema    Assessment/Plan   Diagnoses and all orders for this visit:  Benign essential hypertension  -     CBC and Auto Differential; Future  -     Comprehensive Metabolic Panel; Future  -     Lipid Panel; Future  -     TSH with reflex to Free T4 if abnormal; Future  Arteriosclerosis of coronary artery  -     CBC and Auto " Differential; Future  -     Comprehensive Metabolic Panel; Future  -     Lipid Panel; Future  -     TSH with reflex to Free T4 if abnormal; Future  Primary hypertension  -     CBC and Auto Differential; Future  -     Comprehensive Metabolic Panel; Future  -     Lipid Panel; Future  -     TSH with reflex to Free T4 if abnormal; Future  Hypercholesterolemia  -     CBC and Auto Differential; Future  -     Comprehensive Metabolic Panel; Future  -     Lipid Panel; Future  -     TSH with reflex to Free T4 if abnormal; Future  Mild intermittent asthma without complication    Call with issues  Follow-up with me in 6 months  Continue routine medications  Continue to work on diet and exercise  Zakiya Burr MD

## 2023-12-19 DIAGNOSIS — I10 HYPERTENSION, UNSPECIFIED TYPE: ICD-10-CM

## 2023-12-19 NOTE — TELEPHONE ENCOUNTER
Patient ldm on machine, needs refill on  potassium chloride CR (Klor-Con M20) 20   Send to [facility]  Last appt. 12/1/23  Upcoming appt. 6/4/24

## 2023-12-20 RX ORDER — POTASSIUM CHLORIDE 20 MEQ/1
20 TABLET, EXTENDED RELEASE ORAL DAILY
Qty: 90 TABLET | Refills: 1 | Status: SHIPPED | OUTPATIENT
Start: 2023-12-20

## 2024-01-02 DIAGNOSIS — J30.2 SEASONAL ALLERGIC RHINITIS, UNSPECIFIED TRIGGER: Primary | ICD-10-CM

## 2024-01-02 RX ORDER — AZELASTINE 1 MG/ML
1 SPRAY, METERED NASAL 2 TIMES DAILY
Qty: 90 ML | Refills: 1 | Status: SHIPPED | OUTPATIENT
Start: 2024-01-02 | End: 2024-03-20 | Stop reason: SDUPTHER

## 2024-01-24 PROBLEM — I10 HYPERTENSION: Status: RESOLVED | Noted: 2023-06-06 | Resolved: 2024-01-24

## 2024-01-24 RX ORDER — GABAPENTIN 300 MG/1
300 CAPSULE ORAL 2 TIMES DAILY
COMMUNITY
Start: 2023-02-27

## 2024-01-31 ENCOUNTER — OFFICE VISIT (OUTPATIENT)
Dept: RHEUMATOLOGY | Facility: CLINIC | Age: 79
End: 2024-01-31
Payer: MEDICARE

## 2024-01-31 VITALS
HEART RATE: 69 BPM | BODY MASS INDEX: 31.6 KG/M2 | DIASTOLIC BLOOD PRESSURE: 86 MMHG | OXYGEN SATURATION: 97 % | SYSTOLIC BLOOD PRESSURE: 141 MMHG | TEMPERATURE: 97.9 F | WEIGHT: 161.8 LBS

## 2024-01-31 DIAGNOSIS — M71.21 BAKER'S CYST OF KNEE, RIGHT: Primary | ICD-10-CM

## 2024-01-31 DIAGNOSIS — M46.1 SACROILIITIS, NOT ELSEWHERE CLASSIFIED (CMS-HCC): ICD-10-CM

## 2024-01-31 DIAGNOSIS — I70.0 ATHEROSCLEROSIS OF AORTA (CMS-HCC): ICD-10-CM

## 2024-01-31 DIAGNOSIS — M15.9 PRIMARY OSTEOARTHRITIS INVOLVING MULTIPLE JOINTS: ICD-10-CM

## 2024-01-31 PROBLEM — M15.0 PRIMARY OSTEOARTHRITIS INVOLVING MULTIPLE JOINTS: Status: ACTIVE | Noted: 2024-01-31

## 2024-01-31 PROCEDURE — 99204 OFFICE O/P NEW MOD 45 MIN: CPT | Performed by: INTERNAL MEDICINE

## 2024-01-31 PROCEDURE — 3079F DIAST BP 80-89 MM HG: CPT | Performed by: INTERNAL MEDICINE

## 2024-01-31 PROCEDURE — 1159F MED LIST DOCD IN RCRD: CPT | Performed by: INTERNAL MEDICINE

## 2024-01-31 PROCEDURE — 1160F RVW MEDS BY RX/DR IN RCRD: CPT | Performed by: INTERNAL MEDICINE

## 2024-01-31 PROCEDURE — 3077F SYST BP >= 140 MM HG: CPT | Performed by: INTERNAL MEDICINE

## 2024-01-31 PROCEDURE — 1036F TOBACCO NON-USER: CPT | Performed by: INTERNAL MEDICINE

## 2024-01-31 PROCEDURE — 20610 DRAIN/INJ JOINT/BURSA W/O US: CPT | Performed by: INTERNAL MEDICINE

## 2024-01-31 RX ORDER — FUROSEMIDE 40 MG/1
20 TABLET ORAL DAILY
COMMUNITY
Start: 2024-01-15 | End: 2024-05-16 | Stop reason: ALTCHOICE

## 2024-01-31 RX ORDER — TRIAMCINOLONE ACETONIDE 40 MG/ML
40 INJECTION, SUSPENSION INTRA-ARTICULAR; INTRAMUSCULAR
Status: COMPLETED | OUTPATIENT
Start: 2024-01-31 | End: 2024-01-31

## 2024-01-31 RX ORDER — MELOXICAM 15 MG/1
15 TABLET ORAL DAILY
Qty: 30 TABLET | Refills: 11 | Status: SHIPPED | OUTPATIENT
Start: 2024-01-31 | End: 2024-02-23 | Stop reason: SINTOL

## 2024-01-31 RX ADMIN — TRIAMCINOLONE ACETONIDE 40 MG: 40 INJECTION, SUSPENSION INTRA-ARTICULAR; INTRAMUSCULAR at 16:48

## 2024-01-31 ASSESSMENT — ENCOUNTER SYMPTOMS
TROUBLE SWALLOWING: 0
FEVER: 0
WHEEZING: 0
BACK PAIN: 1
UNEXPECTED WEIGHT CHANGE: 0
COUGH: 1
DYSPHORIC MOOD: 0
NUMBNESS: 1
HEADACHES: 1
AGITATION: 1
POLYDIPSIA: 0
SORE THROAT: 0
BRUISES/BLEEDS EASILY: 0
DIZZINESS: 0
CHILLS: 0
HEMATURIA: 0
NAUSEA: 0
DIFFICULTY URINATING: 0
JOINT SWELLING: 1
ARTHRALGIAS: 1
EYE DISCHARGE: 0
EYE ITCHING: 1
EYE REDNESS: 0
MYALGIAS: 0
FATIGUE: 0
ADENOPATHY: 0
CONSTIPATION: 0
CHEST TIGHTNESS: 0
NERVOUS/ANXIOUS: 0
COLOR CHANGE: 0
PALPITATIONS: 0
DIARRHEA: 0
DYSURIA: 0
VOMITING: 0
SHORTNESS OF BREATH: 0
ABDOMINAL PAIN: 0
SLEEP DISTURBANCE: 0
WEAKNESS: 0
PHOTOPHOBIA: 0
EYE PAIN: 0

## 2024-01-31 ASSESSMENT — PATIENT HEALTH QUESTIONNAIRE - PHQ9
2. FEELING DOWN, DEPRESSED OR HOPELESS: NOT AT ALL
SUM OF ALL RESPONSES TO PHQ9 QUESTIONS 1 & 2: 0
1. LITTLE INTEREST OR PLEASURE IN DOING THINGS: NOT AT ALL

## 2024-01-31 NOTE — ASSESSMENT & PLAN NOTE
Chronic Condition Documentation: Stable based on symptoms and exam. Continue      established treatment plan and follow-up at least yearly.  Monitored by PCP

## 2024-01-31 NOTE — LETTER
January 31, 2024     Zakiya Burr MD  [address]  [facility]  [facility] [address]    Patient: Nevin Rodney   YOB: 1945   Date of Visit: 1/31/2024       Dear Dr. Zakiya Burr MD:    Thank you for referring Nevin Rodney to me for evaluation. Below are my notes for this consultation.  If you have questions, please do not hesitate to call me. I look forward to following your patient along with you.       Sincerely,     Clarissa Sherman MD      CC: No Recipients  ______________________________________________________________________________________    Rheumatology Consultation note  PCP:  A[facility]    See scanned history form  Chief Complaint   Patient presents with   • Establish Care   • Joint Pain       SUBJECTIVE  Pt reports she developed a lump in the back of her knee and was dx with a Baker's cyst.   It still gives her trouble and wonders what can be done. She has been on celebrex for years for arthritis--doesn't know if it works as well as it did before    Pt does have long standing issues with back.  Sees pain management.   Injections help        Patient Active Problem List    Diagnosis Date Noted   • Baker's cyst of knee, right 01/31/2024   • Primary osteoarthritis involving multiple joints 01/31/2024   • Atherosclerosis of aorta (CMS/HCC) 08/24/2023   • Sacroiliitis, not elsewhere classified (CMS/HCC) 06/06/2023   • Arteriosclerosis of coronary artery 06/06/2023   • Benign essential hypertension 06/06/2023   • Degenerative disc disease, lumbar 06/06/2023   • Gastroesophageal reflux disease 06/06/2023   • Hypercholesterolemia 06/06/2023   • LBBB (left bundle branch block) 06/06/2023   • Lumbar spinal stenosis 06/06/2023   • Asthma 06/06/2023   • Seasonal allergic rhinitis 06/06/2023   • Varicose veins with pain 06/06/2023   • 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 06/01/2023     Past Medical History:   Diagnosis Date   • Arthralgia of  temporomandibular joint, unspecified side 08/20/2015    TMJ arthralgia   • Consumes 2 to 3 servings of caffeine per day    • Eczema 10/27/2014    History of eczema   • Gastric ulcer     History of gastric ulcer   • Spondylosis without myelopathy or radiculopathy, cervical region 04/26/2014    DJD (degenerative joint disease) of cervical spine   • Unspecified abdominal hernia without obstruction or gangrene     Hernia     Past Surgical History:   Procedure Laterality Date   • CARDIAC CATHETERIZATION  02/25/2022    Cardiac catheterization   • CARPAL TUNNEL RELEASE  02/25/2022    Carpal tunnel surgery   • COLONOSCOPY  04/02/2014    Complete Colonoscopy   • HERNIA REPAIR  11/08/2016    Hernia Repair   • INCISIONAL BREAST BIOPSY  04/02/2014    Incisional Breast Biopsy       Current Outpatient Medications   Medication Instructions   • albuterol (ProAir HFA) 90 mcg/actuation inhaler 1-2 puffs, inhalation, Every 4 hours PRN   • atenolol (TENORMIN) 25 mg, Daily   • azelastine (Astelin) 137 mcg (0.1 %) nasal spray 1 spray, Each Nostril, 2 times daily, Use in each nostril as directed   • calcium 500 mg calcium (1,250 mg) tablet 1 tablet, oral, 2 times daily with meals   • cholecalciferol (VITAMIN D-3) 125 mcg, oral, Daily   • clotrimazole-betamethasone (Lotrisone) cream 1 Application, Topical, 2 times daily   • Echinacea purpurea extract (echinacea) 125 mg tablet oral   • esomeprazole (NEXIUM) 40 mg, oral, Daily before breakfast, Do not open capsule.   • fluticasone (Flonase) 50 mcg/actuation nasal spray 2 sprays, Each Nostril, Daily PRN, Shake gently. Before first use, prime pump. After use, clean tip and replace cap.   • fluticasone propion-salmeteroL (Advair Diskus) 250-50 mcg/dose diskus inhaler 1 puff, inhalation, Every 12 hours   • furosemide (LASIX) 20 mg, oral, Daily   • gabapentin (NEURONTIN) 300 mg, oral, 2 times daily   • loratadine (CLARITIN) 10 mg, oral, Daily   • losartan (Cozaar) 25 mg tablet 1 tablet, oral,  Daily   • meloxicam (MOBIC) 15 mg, oral, Daily   • nystatin (Mycostatin) 100,000 unit/gram powder 1 Application, Topical, 2 times daily   • omega-3 acid ethyl esters (LOVAZA) 1 g, oral, 2 times daily   • potassium chloride CR 20 mEq ER tablet 20 mEq, oral, Daily   • simvastatin (Zocor) 20 mg tablet 1 tablet, oral, Nightly     Allergies   Allergen Reactions   • Darvon [Propoxyphene] Hallucinations   • Amoxil [Amoxicillin] Rash     Review of Systems   Constitutional:  Negative for chills, fatigue, fever and unexpected weight change.   HENT:  Negative for congestion, hearing loss, mouth sores, nosebleeds, postnasal drip, sneezing, sore throat, tinnitus and trouble swallowing.         No dry eye and dry mouth   Eyes:  Positive for itching. Negative for photophobia, pain, discharge, redness and visual disturbance.        Eye dryness   Respiratory:  Positive for cough. Negative for chest tightness, shortness of breath and wheezing.    Cardiovascular:  Negative for chest pain, palpitations and leg swelling.   Gastrointestinal:  Negative for abdominal pain, constipation, diarrhea, nausea and vomiting.   Endocrine: Negative for polydipsia and polyuria.   Genitourinary:  Negative for difficulty urinating, dysuria and hematuria.   Musculoskeletal:  Positive for arthralgias, back pain and joint swelling. Negative for gait problem and myalgias.   Skin:  Negative for color change and rash.   Allergic/Immunologic: Negative for immunocompromised state.   Neurological:  Positive for numbness and headaches. Negative for dizziness and weakness.   Hematological:  Negative for adenopathy. Does not bruise/bleed easily.   Psychiatric/Behavioral:  Positive for agitation. Negative for dysphoric mood and sleep disturbance. The patient is not nervous/anxious.        PHYSICAL EXAM  /86 (BP Location: Left arm, Patient Position: Sitting, BP Cuff Size: Adult)   Pulse 69   Temp 36.6 °C (97.9 °F) (Temporal)   Wt 73.4 kg (161 lb 12.8 oz)    "SpO2 97%   BMI 31.60 kg/m²     Physical Exam  Vitals reviewed.   Constitutional:       General: She is not in acute distress.     Appearance: Normal appearance.   HENT:      Head: Normocephalic and atraumatic.   Eyes:      Conjunctiva/sclera: Conjunctivae normal.   Pulmonary:      Effort: Pulmonary effort is normal. No respiratory distress.   Musculoskeletal:         General: Swelling and tenderness present. No deformity.      Cervical back: Normal range of motion.      Right lower leg: No edema.      Left lower leg: No edema.      Comments: No synovitis of hand joints.  +marked scoliosis  R knee with palpable small baker's cyst.  L shoulder with pain and pop on ROM   Skin:     Coloration: Skin is not pale.      Findings: No erythema or rash.   Neurological:      General: No focal deficit present.      Mental Status: She is alert and oriented to person, place, and time. Mental status is at baseline.      Gait: Gait normal.   Psychiatric:         Mood and Affect: Mood normal.       Patient ID: Kaity Rodney \"Nevin\" is a 78 y.o. female.    Large Joint Injection/Arthrocentesis: R knee on 1/31/2024 4:48 PM  Indications: pain and joint swelling (Baker cyst)  Details: 25 G needle, medial approach  Medications: 40 mg triamcinolone acetonide 40 mg/mL  Outcome: tolerated well, no immediate complications  Procedure, treatment alternatives, risks and benefits explained, specific risks discussed. Consent was given by the patient. Immediately prior to procedure a time out was called to verify the correct patient, procedure, equipment, support staff and site/side marked as required. Patient was prepped and draped in the usual sterile fashion.         Assessment/plan  Problem List Items Addressed This Visit       Sacroiliitis, not elsewhere classified (CMS/HCC)    Current Assessment & Plan     Chronic Condition Documentation: Stable based on symptoms and exam. Continue      established treatment plan and follow-up at least " yearly.  Sees pain management         Atherosclerosis of aorta (CMS/[facility])    Current Assessment & Plan     Chronic Condition Documentation: Stable based on symptoms and exam. Continue      established treatment plan and follow-up at least yearly.  Monitored by PCP         Morales's cyst of knee, right - Primary    Current Assessment & Plan     Pt with Chronic Baker's cyst that still bothers her.  Injection done for relief of symptoms          Primary osteoarthritis involving multiple joints    Current Assessment & Plan     Will change NSAID to meloxicam         Relevant Medications    meloxicam (Mobic) 15 mg tablet     Follow up: as needed.  If meloxicam helpful, PCP can prescribe.  Follow up if another knee injection needed for Baker's cyst    The patient's labs, radiology images and reports and other tests done prior to appointment were obtained, reviewed and summarized as applicable from the physician portal, EHR symptoms and/or outside sources.  Pertinent positive and negative findings were considered in medical decision making.  Old records were reviewed and further were requested from previous physicians  All questions were answered and patient was counseled regarding diagnosis, prognosis, risks and benefits of various treatment options and importance of compliance with therapy

## 2024-01-31 NOTE — PROGRESS NOTES
Rheumatology Consultation note  PCP:  Aminah    See scanned history form  Chief Complaint   Patient presents with    Establish Care    Joint Pain       SUBJECTIVE  Pt reports she developed a lump in the back of her knee and was dx with a Baker's cyst.   It still gives her trouble and wonders what can be done. She has been on celebrex for years for arthritis--doesn't know if it works as well as it did before    Pt does have long standing issues with back.  Sees pain management.   Injections help        Patient Active Problem List    Diagnosis Date Noted    Baker's cyst of knee, right 01/31/2024    Primary osteoarthritis involving multiple joints 01/31/2024    Atherosclerosis of aorta (CMS/[facility]) 08/24/2023    Sacroiliitis, not elsewhere classified (CMS/[facility]) 06/06/2023    Arteriosclerosis of coronary artery 06/06/2023    Benign essential hypertension 06/06/2023    Degenerative disc disease, lumbar 06/06/2023    Gastroesophageal reflux disease 06/06/2023    Hypercholesterolemia 06/06/2023    LBBB (left bundle branch block) 06/06/2023    Lumbar spinal stenosis 06/06/2023    Asthma 06/06/2023    Seasonal allergic rhinitis 06/06/2023    Varicose veins with pain 06/06/2023    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 06/01/2023     Past Medical History:   Diagnosis Date    Arthralgia of temporomandibular joint, unspecified side 08/20/2015    TMJ arthralgia    Consumes 2 to 3 servings of caffeine per day     Eczema 10/27/2014    History of eczema    Gastric ulcer     History of gastric ulcer    Spondylosis without myelopathy or radiculopathy, cervical region 04/26/2014    DJD (degenerative joint disease) of cervical spine    Unspecified abdominal hernia without obstruction or gangrene     Hernia     Past Surgical History:   Procedure Laterality Date    CARDIAC CATHETERIZATION  02/25/2022    Cardiac catheterization    CARPAL TUNNEL RELEASE  02/25/2022    Carpal tunnel surgery    COLONOSCOPY  04/02/2014     Complete Colonoscopy    HERNIA REPAIR  11/08/2016    Hernia Repair    INCISIONAL BREAST BIOPSY  04/02/2014    Incisional Breast Biopsy       Current Outpatient Medications   Medication Instructions    albuterol (ProAir HFA) 90 mcg/actuation inhaler 1-2 puffs, inhalation, Every 4 hours PRN    atenolol (TENORMIN) 25 mg, Daily    azelastine (Astelin) 137 mcg (0.1 %) nasal spray 1 spray, Each Nostril, 2 times daily, Use in each nostril as directed    calcium 500 mg calcium (1,250 mg) tablet 1 tablet, oral, 2 times daily with meals    cholecalciferol (VITAMIN D-3) 125 mcg, oral, Daily    clotrimazole-betamethasone (Lotrisone) cream 1 Application, Topical, 2 times daily    Echinacea purpurea extract (echinacea) 125 mg tablet oral    esomeprazole (NEXIUM) 40 mg, oral, Daily before breakfast, Do not open capsule.    fluticasone (Flonase) 50 mcg/actuation nasal spray 2 sprays, Each Nostril, Daily PRN, Shake gently. Before first use, prime pump. After use, clean tip and replace cap.    fluticasone propion-salmeteroL (Advair Diskus) 250-50 mcg/dose diskus inhaler 1 puff, inhalation, Every 12 hours    furosemide (LASIX) 20 mg, oral, Daily    gabapentin (NEURONTIN) 300 mg, oral, 2 times daily    loratadine (CLARITIN) 10 mg, oral, Daily    losartan (Cozaar) 25 mg tablet 1 tablet, oral, Daily    meloxicam (MOBIC) 15 mg, oral, Daily    nystatin (Mycostatin) 100,000 unit/gram powder 1 Application, Topical, 2 times daily    omega-3 acid ethyl esters (LOVAZA) 1 g, oral, 2 times daily    potassium chloride CR 20 mEq ER tablet 20 mEq, oral, Daily    simvastatin (Zocor) 20 mg tablet 1 tablet, oral, Nightly     Allergies   Allergen Reactions    Darvon [Propoxyphene] Hallucinations    Amoxil [Amoxicillin] Rash     Review of Systems   Constitutional:  Negative for chills, fatigue, fever and unexpected weight change.   HENT:  Negative for congestion, hearing loss, mouth sores, nosebleeds, postnasal drip, sneezing, sore throat, tinnitus and  trouble swallowing.         No dry eye and dry mouth   Eyes:  Positive for itching. Negative for photophobia, pain, discharge, redness and visual disturbance.        Eye dryness   Respiratory:  Positive for cough. Negative for chest tightness, shortness of breath and wheezing.    Cardiovascular:  Negative for chest pain, palpitations and leg swelling.   Gastrointestinal:  Negative for abdominal pain, constipation, diarrhea, nausea and vomiting.   Endocrine: Negative for polydipsia and polyuria.   Genitourinary:  Negative for difficulty urinating, dysuria and hematuria.   Musculoskeletal:  Positive for arthralgias, back pain and joint swelling. Negative for gait problem and myalgias.   Skin:  Negative for color change and rash.   Allergic/Immunologic: Negative for immunocompromised state.   Neurological:  Positive for numbness and headaches. Negative for dizziness and weakness.   Hematological:  Negative for adenopathy. Does not bruise/bleed easily.   Psychiatric/Behavioral:  Positive for agitation. Negative for dysphoric mood and sleep disturbance. The patient is not nervous/anxious.        PHYSICAL EXAM  /86 (BP Location: Left arm, Patient Position: Sitting, BP Cuff Size: Adult)   Pulse 69   Temp 36.6 °C (97.9 °F) (Temporal)   Wt 73.4 kg (161 lb 12.8 oz)   SpO2 97%   BMI 31.60 kg/m²     Physical Exam  Vitals reviewed.   Constitutional:       General: She is not in acute distress.     Appearance: Normal appearance.   HENT:      Head: Normocephalic and atraumatic.   Eyes:      Conjunctiva/sclera: Conjunctivae normal.   Pulmonary:      Effort: Pulmonary effort is normal. No respiratory distress.   Musculoskeletal:         General: Swelling and tenderness present. No deformity.      Cervical back: Normal range of motion.      Right lower leg: No edema.      Left lower leg: No edema.      Comments: No synovitis of hand joints.  +marked scoliosis  R knee with palpable small baker's cyst.  L shoulder with pain  "and pop on ROM   Skin:     Coloration: Skin is not pale.      Findings: No erythema or rash.   Neurological:      General: No focal deficit present.      Mental Status: She is alert and oriented to person, place, and time. Mental status is at baseline.      Gait: Gait normal.   Psychiatric:         Mood and Affect: Mood normal.       Patient ID: Kaity Rodney \"Mike" is a 78 y.o. female.    Large Joint Injection/Arthrocentesis: R knee on 1/31/2024 4:48 PM  Indications: pain and joint swelling (Baker cyst)  Details: 25 G needle, medial approach  Medications: 40 mg triamcinolone acetonide 40 mg/mL  Outcome: tolerated well, no immediate complications  Procedure, treatment alternatives, risks and benefits explained, specific risks discussed. Consent was given by the patient. Immediately prior to procedure a time out was called to verify the correct patient, procedure, equipment, support staff and site/side marked as required. Patient was prepped and draped in the usual sterile fashion.         Assessment/plan  Problem List Items Addressed This Visit       Sacroiliitis, not elsewhere classified (CMS/HCC)    Current Assessment & Plan     Chronic Condition Documentation: Stable based on symptoms and exam. Continue      established treatment plan and follow-up at least yearly.  Sees pain management         Atherosclerosis of aorta (CMS/HCC)    Current Assessment & Plan     Chronic Condition Documentation: Stable based on symptoms and exam. Continue      established treatment plan and follow-up at least yearly.  Monitored by PCP         Morales's cyst of knee, right - Primary    Current Assessment & Plan     Pt with Chronic Baker's cyst that still bothers her.  Injection done for relief of symptoms          Primary osteoarthritis involving multiple joints    Current Assessment & Plan     Will change NSAID to meloxicam         Relevant Medications    meloxicam (Mobic) 15 mg tablet     Follow up: as needed.  If meloxicam " helpful, PCP can prescribe.  Follow up if another knee injection needed for Baker's cyst    The patient's labs, radiology images and reports and other tests done prior to appointment were obtained, reviewed and summarized as applicable from the physician portal, EHR symptoms and/or outside sources.  Pertinent positive and negative findings were considered in medical decision making.  Old records were reviewed and further were requested from previous physicians  All questions were answered and patient was counseled regarding diagnosis, prognosis, risks and benefits of various treatment options and importance of compliance with therapy

## 2024-01-31 NOTE — PATIENT INSTRUCTIONS
It was a pleasure to see you today  Please call if your symptoms worsen  Please review your summary for education and reminders.  Follow up at your next appointment.    If you had labs/xrays done today, you will be able to view on [facility].   We will contact you when the results are reviewed for further discussion.  Please note that you may receive your results before I have had a chance to review.  Please know I will be contacting you for discussion  Homegoing instructions for all patient  A healthy lifestyle helps chronic diseases  These are all the goals you should strive to improve your overall health   Blood pressure <130/85   BMI of <30 or waist circumference that is 1/2 of your height   Fasting blood sugar <107 (if you are diabetic, aim for an A1c <6.4%_   LDL cholesterol <130   Avoid smoking   Manage your stress   Get your preventive exams   Get your immunizations

## 2024-01-31 NOTE — ASSESSMENT & PLAN NOTE
Chronic Condition Documentation: Stable based on symptoms and exam. Continue      established treatment plan and follow-up at least yearly.  Sees pain management

## 2024-02-23 ENCOUNTER — TELEPHONE (OUTPATIENT)
Dept: RHEUMATOLOGY | Facility: CLINIC | Age: 79
End: 2024-02-23
Payer: MEDICARE

## 2024-03-20 DIAGNOSIS — J30.2 SEASONAL ALLERGIC RHINITIS, UNSPECIFIED TRIGGER: Primary | ICD-10-CM

## 2024-03-20 RX ORDER — AZELASTINE 1 MG/ML
1 SPRAY, METERED NASAL 2 TIMES DAILY
Qty: 90 ML | Refills: 1 | Status: SHIPPED | OUTPATIENT
Start: 2024-03-20

## 2024-03-27 ENCOUNTER — TELEPHONE (OUTPATIENT)
Dept: PRIMARY CARE | Facility: CLINIC | Age: 79
End: 2024-03-27
Payer: MEDICARE

## 2024-03-27 NOTE — TELEPHONE ENCOUNTER
Not on current med list.  Called and spoke to pt and she states Dr. Sherman started her on Meloxicam , but had swelling in her feet and called Dr Sherman and stopped taking it.  Asking if you will fill Celebrex 200 mg  Take 1 tablet daily.  Please send to [facility].    Last appt-12/1/23  Next appt-6/4/24  Last BW-10/16/23

## 2024-05-15 NOTE — ASSESSMENT & PLAN NOTE
ACT is 22.  She had COVID last Monday and has persistent cough productive of mucus.  She has not used her albuterol.    Continue Advair 250/50 twice a day and albuterol as needed and for cough.

## 2024-05-15 NOTE — PROGRESS NOTES
"Subjective   Patient ID:   [id number]   Kaity Rodney \"Nevin\" is a 79 y.o. female who presents for Follow-up.    Chief Complaint   Patient presents with    Follow-up      Since last visit, 11-, patient reports she had COVID last Monday and has residual cough that ends up in her chests.  She then will experience mucus.  She has never used her albuterol so she is not sure if it helps or not.  She does use her inhaler, which provides relief.  She was otherwise well but the cough is always present.    Patient states her heartburn is rare and mainly when she eats spicy foods.    Review of Systems   Respiratory:  Positive for cough (productive of mucus).      Objective     Pulse 77   Ht 1.499 m (4' 11\")   Wt 71.2 kg (157 lb)   BMI 31.71 kg/m²      Physical Exam  Constitutional:       Appearance: Normal appearance.   HENT:      Head: Normocephalic and atraumatic.      Right Ear: External ear normal. There is no impacted cerumen.      Left Ear: External ear normal. There is no impacted cerumen.      Nose: No congestion or rhinorrhea.   Eyes:      Extraocular Movements: Extraocular movements intact.      Conjunctiva/sclera: Conjunctivae normal.      Pupils: Pupils are equal, round, and reactive to light.   Cardiovascular:      Rate and Rhythm: Normal rate and regular rhythm.      Heart sounds: No murmur heard.     No friction rub. No gallop.   Pulmonary:      Effort: No respiratory distress.      Breath sounds: No wheezing, rhonchi or rales.   Skin:     General: Skin is warm and dry.   Neurological:      Mental Status: She is alert.   Psychiatric:         Mood and Affect: Mood normal.         Behavior: Behavior normal.     Assessment/Plan     Asthma ([facility])  ACT is 22.  She had COVID last Monday and has persistent cough productive of mucus.  She has not used her albuterol.    Continue Advair 250/50 twice a day and albuterol as needed and for cough.        By signing my name below, Scarlett ALVARADO Scribe, " attest that this documentation has been prepared under the direction and in the presence of Evelyn Turner MD.  All medical record entries made by the Scribe were at my direction and personally dictated by me. I have reviewed the chart and agree that the record accurately reflects my personal performance of the history, physical exam, discussion and plan.

## 2024-05-15 NOTE — PATIENT INSTRUCTIONS
Continue Advair 250/50 twice a day.    Use albuterol as needed and for cough.    Follow up in 6 months or sooner if symptoms worsen prior.

## 2024-05-16 ENCOUNTER — OFFICE VISIT (OUTPATIENT)
Dept: ALLERGY | Facility: CLINIC | Age: 79
End: 2024-05-16
Payer: MEDICARE

## 2024-05-16 VITALS — HEART RATE: 77 BPM | HEIGHT: 59 IN | WEIGHT: 157 LBS | BODY MASS INDEX: 31.65 KG/M2

## 2024-05-16 DIAGNOSIS — J45.20 MILD INTERMITTENT ASTHMA WITHOUT COMPLICATION (HHS-HCC): Primary | ICD-10-CM

## 2024-05-16 DIAGNOSIS — J45.20 MILD INTERMITTENT REACTIVE AIRWAY DISEASE WITHOUT COMPLICATION (HHS-HCC): ICD-10-CM

## 2024-05-16 DIAGNOSIS — J30.2 SEASONAL ALLERGIC RHINITIS DUE TO FUNGAL SPORES: ICD-10-CM

## 2024-05-16 PROCEDURE — 1160F RVW MEDS BY RX/DR IN RCRD: CPT | Performed by: ALLERGY & IMMUNOLOGY

## 2024-05-16 PROCEDURE — 1159F MED LIST DOCD IN RCRD: CPT | Performed by: ALLERGY & IMMUNOLOGY

## 2024-05-16 PROCEDURE — 99214 OFFICE O/P EST MOD 30 MIN: CPT | Performed by: ALLERGY & IMMUNOLOGY

## 2024-05-16 PROCEDURE — 96160 PT-FOCUSED HLTH RISK ASSMT: CPT | Performed by: ALLERGY & IMMUNOLOGY

## 2024-05-16 RX ORDER — TORSEMIDE 20 MG/1
40 TABLET ORAL DAILY
Qty: 60 TABLET | Refills: 0 | Status: SHIPPED | OUTPATIENT
Start: 2024-05-16

## 2024-05-16 RX ORDER — TORSEMIDE 20 MG/1
20 TABLET ORAL DAILY
COMMUNITY
End: 2024-05-16 | Stop reason: DRUGHIGH

## 2024-05-16 ASSESSMENT — ENCOUNTER SYMPTOMS: COUGH: 1

## 2024-05-23 DIAGNOSIS — B37.9 CANDIDIASIS: ICD-10-CM

## 2024-05-23 DIAGNOSIS — I10 PRIMARY HYPERTENSION: ICD-10-CM

## 2024-05-23 RX ORDER — NYSTATIN 100000 [USP'U]/G
1 POWDER TOPICAL 2 TIMES DAILY
Qty: 90 G | Refills: 0 | Status: SHIPPED | OUTPATIENT
Start: 2024-05-23

## 2024-05-23 RX ORDER — TORSEMIDE 20 MG/1
40 TABLET ORAL DAILY
Qty: 180 TABLET | Refills: 0 | OUTPATIENT
Start: 2024-05-23

## 2024-05-23 NOTE — TELEPHONE ENCOUNTER
Spoke w/patient needs refills on   Nystatin powder  Potassium Chloride 40 mg **  Send to [facility]  Last appt. 12/1/23  Upcoming appt. 6/4/24    **patient stated Dr Serrano put her on a water pill (Torsemide 20 mg takes twice daily- 3wks ago) & he increased her Potassium Chloride to 40 mg.**

## 2024-05-28 ENCOUNTER — OFFICE VISIT (OUTPATIENT)
Dept: DERMATOLOGY | Facility: CLINIC | Age: 79
End: 2024-05-28
Payer: MEDICARE

## 2024-05-28 DIAGNOSIS — L81.4 LENTIGO: ICD-10-CM

## 2024-05-28 DIAGNOSIS — L82.1 SEBORRHEIC KERATOSIS: ICD-10-CM

## 2024-05-28 DIAGNOSIS — D22.9 NEVUS: ICD-10-CM

## 2024-05-28 DIAGNOSIS — Z12.83 SKIN CANCER SCREENING: Primary | ICD-10-CM

## 2024-05-28 PROCEDURE — 1160F RVW MEDS BY RX/DR IN RCRD: CPT | Performed by: NURSE PRACTITIONER

## 2024-05-28 PROCEDURE — 1159F MED LIST DOCD IN RCRD: CPT | Performed by: NURSE PRACTITIONER

## 2024-05-28 PROCEDURE — 1036F TOBACCO NON-USER: CPT | Performed by: NURSE PRACTITIONER

## 2024-05-28 PROCEDURE — 99213 OFFICE O/P EST LOW 20 MIN: CPT | Performed by: NURSE PRACTITIONER

## 2024-05-28 NOTE — PROGRESS NOTES
Subjective     Nevin Rodney is a 79 y.o. female who presents for the following: Skin Check.   Established patient in for skin exam.     Review of Systems:  No other skin or systemic complaints other than what is documented elsewhere in the note.    The following portions of the chart were reviewed this encounter and updated as appropriate:       Skin Cancer History  SCC- right upper medial nose- 2018   SCC- mid back - 03/2021  AK's-left nasal tip -03/2018     Specialty Problems    None    Past Medical History:  Nevin Rodney  has a past medical history of Arthralgia of temporomandibular joint, unspecified side (08/20/2015), Consumes 2 to 3 servings of caffeine per day, Eczema (10/27/2014), Gastric ulcer, Spondylosis without myelopathy or radiculopathy, cervical region (04/26/2014), and Unspecified abdominal hernia without obstruction or gangrene.    Past Surgical History:  Nevin Rodney  has a past surgical history that includes Incisional breast biopsy (04/02/2014); Colonoscopy (04/02/2014); Cardiac catheterization (02/25/2022); Carpal tunnel release (02/25/2022); and Hernia repair (11/08/2016).    Family History:  Patient family history includes Brain cancer in her sister; Cancer in her father; Dementia in her mother.    Social History:  Nevin Rodney  reports that she has never smoked. She has never been exposed to tobacco smoke. She has never used smokeless tobacco. She reports that she does not drink alcohol and does not use drugs.    Allergies:  Darvon [propoxyphene] and Amoxil [amoxicillin]    Current Medications / CAM's:    Current Outpatient Medications:     albuterol (ProAir HFA) 90 mcg/actuation inhaler, Inhale 1-2 puffs every 4 hours if needed for wheezing or shortness of breath (cough)., Disp: 8.5 g, Rfl: 0    atenolol (Tenormin) 25 mg tablet, 1 tablet (25 mg) once daily., Disp: , Rfl:     azelastine (Astelin) 137 mcg (0.1 %) nasal spray, Administer 1 spray into each nostril 2 times a day. Use in  each nostril as directed, Disp: 90 mL, Rfl: 1    calcium 500 mg calcium (1,250 mg) tablet, Take 1 tablet (1,250 mg) by mouth 2 times a day with meals., Disp: , Rfl:     cholecalciferol (Vitamin D-3) 125 MCG (5000 UT) capsule, Take 1 capsule (125 mcg) by mouth once daily., Disp: , Rfl:     clotrimazole-betamethasone (Lotrisone) cream, Apply 1 Application topically 2 times a day., Disp: 45 g, Rfl: 1    Echinacea purpurea extract (echinacea) 125 mg tablet, Take by mouth., Disp: , Rfl:     esomeprazole (NexIUM) 20 mg DR capsule, Take 2 capsules (40 mg) by mouth once daily in the morning. Take before meals. Do not open capsule., Disp: , Rfl:     fluticasone (Flonase) 50 mcg/actuation nasal spray, Administer 2 sprays into each nostril once daily as needed for rhinitis (ear congestion). Shake gently. Before first use, prime pump. After use, clean tip and replace cap., Disp: , Rfl:     fluticasone propion-salmeteroL (Advair Diskus) 250-50 mcg/dose diskus inhaler, Inhale 1 puff every 12 hours., Disp: 3 each, Rfl: 3    gabapentin (Neurontin) 300 mg capsule, Take 1 capsule (300 mg) by mouth 2 times a day., Disp: , Rfl:     loratadine (Claritin) 10 mg tablet, Take 1 tablet (10 mg) by mouth once daily., Disp: , Rfl:     losartan (Cozaar) 25 mg tablet, Take 1 tablet (25 mg) by mouth once daily., Disp: , Rfl:     nystatin (Mycostatin) 100,000 unit/gram powder, Apply 1 Application topically 2 times a day., Disp: 90 g, Rfl: 0    omega-3 acid ethyl esters (Lovaza) 1 gram capsule, Take 1 capsule (1 g) by mouth 2 times a day., Disp: , Rfl:     potassium chloride CR 20 mEq ER tablet, Take 1 tablet (20 mEq) by mouth once daily., Disp: 90 tablet, Rfl: 1    simvastatin (Zocor) 20 mg tablet, Take 1 tablet (20 mg) by mouth once daily at bedtime., Disp: , Rfl:     torsemide (Demadex) 20 mg tablet, Take 2 tablets (40 mg) by mouth once daily., Disp: 60 tablet, Rfl: 0     Objective   Well appearing patient in no apparent distress; mood and  affect are within normal limits.    A full examination was performed including scalp, head, eyes, ears, nose, lips, neck, chest, axillae, abdomen, back, buttocks, bilateral upper extremities, bilateral lower extremities, hands, feet, fingers, toes, fingernails, and toenails. All findings within normal limits unless otherwise noted below.    Assessment/Plan   1. Skin cancer screening    The patient presented for a routine skin examination today. There are no specific concerns regarding skin health and no new or changing moles, lesions, or rashes.     Assessment: Based on the comprehensive skin examination, there were no concerning or abnormal findings. The patient's skin appeared to be in good health, without any notable dermatologic conditions or lesions.    Plan: Given the absence of any significant skin findings, no specific interventions or treatments are warranted at this time. The patient was educated on the importance of regular skin self-examinations and advised to promptly report any changes or concerns. Routine follow-up for a skin examination was recommended.    -These lesions have benign, reassuring patterns on dermoscopy.  -There were no concerning features found on exam today.  -Recommend continued self-observation, and to contact the office if any changes in nevi are  noticed.    Discussed/information given on safe sun practices and use of sunscreen, sun protective clothing or sun avoidance. Recommend to use OTC medication of sunscreen SPF 30 or higher on a daily basis prior to sun exposure to reduce the risk of skin cancer.    Contact Office if: Any lesions change in size, shape or color; itch, bum or bleed.         2. Nevus  Multiple benign appearing flesh colored to pigmented macules and papules     Plan: Counseling.  I counseled the patient regarding the following:  Instructions: Monthly self-skin checks to monitor for any changes in moles are recommended. Expectations: Benign Nevi are pigmented  "nests of cells within the skin.No treatment is necessary. Contact Office if: Any moles change in size, shape or color; itch, bum or bleed.    3. Lentigo  Scattered tan macules in sun-exposed areas.    Solar lentigo (a type of lentigo also known as a senile lentigo, age spot, or liver spot) is a benign pigmented macule appearing on fair-skinned individuals that is related to ultraviolet radiation (UVR) exposure, typically from the sun.     PLAN:  Limiting sun exposure through avoidance, protective clothing, and use of sunscreens can help prevent the appearance of solar lentigines.    If lesion changes or becomes symptomatic she should return to clinic    4. Seborrheic keratosis    Seborrheic keratoses (SKs) are extremely common benign neoplasms of the skin. There can be few or hundreds of these raised, \"stuck-on\"-appearing papules and plaques with well-defined borders. The cause is unknown, although there is a familial trait for the development of multiple SKs.      SKs tend to increase in incidence and number with increasing age.     Skin Care: Seborrheic Keratoses are benign. No treatment is necessary.    Patient was instructed to call the office if any lesions become irritated or inflamed               "

## 2024-06-03 LAB
NON-UH HIE A/G RATIO: 1.1
NON-UH HIE ALB: 3.4 G/DL (ref 3.4–5)
NON-UH HIE ALK PHOS: 89 UNIT/L (ref 45–117)
NON-UH HIE BASO COUNT: 0.04 X1000 (ref 0–0.2)
NON-UH HIE BASOS %: 1.1 %
NON-UH HIE BILIRUBIN, TOTAL: 0.6 MG/DL (ref 0.3–1.2)
NON-UH HIE BUN/CREAT RATIO: 15
NON-UH HIE BUN: 12 MG/DL (ref 9–23)
NON-UH HIE CALCIUM: 10.1 MG/DL (ref 8.7–10.4)
NON-UH HIE CALCULATED LDL CHOLESTEROL: 73 MG/DL (ref 60–130)
NON-UH HIE CALCULATED OSMOLALITY: 283 MOSM/KG (ref 275–295)
NON-UH HIE CHLORIDE: 102 MMOL/L (ref 98–107)
NON-UH HIE CHOLESTEROL: 177 MG/DL (ref 100–200)
NON-UH HIE CO2, VENOUS: 35 MMOL/L (ref 20–31)
NON-UH HIE CREATININE: 0.8 MG/DL (ref 0.5–0.8)
NON-UH HIE DIFF?: NO
NON-UH HIE EOS COUNT: 0.22 X1000 (ref 0–0.5)
NON-UH HIE EOSIN %: 5.2 %
NON-UH HIE GFR AA: >60
NON-UH HIE GLOBULIN: 3.1 G/DL
NON-UH HIE GLOMERULAR FILTRATION RATE: >60 ML/MIN/1.73M?
NON-UH HIE GLUCOSE: 97 MG/DL (ref 74–106)
NON-UH HIE GOT: 24 UNIT/L (ref 15–37)
NON-UH HIE GPT: 18 UNIT/L (ref 10–49)
NON-UH HIE HCT: 43.1 % (ref 36–46)
NON-UH HIE HDL CHOLESTEROL: 61 MG/DL (ref 40–60)
NON-UH HIE HGB: 14.5 G/DL (ref 12–16)
NON-UH HIE INSTR WBC: 4.2
NON-UH HIE K: 3.7 MMOL/L (ref 3.5–5.1)
NON-UH HIE LYMPH %: 28.5 %
NON-UH HIE LYMPH COUNT: 1.2 X1000 (ref 1.2–4.8)
NON-UH HIE MCH: 30.3 PG (ref 27–34)
NON-UH HIE MCHC: 33.6 G/DL (ref 32–37)
NON-UH HIE MCV: 90.3 FL (ref 80–100)
NON-UH HIE MONO %: 10.6 %
NON-UH HIE MONO COUNT: 0.45 X1000 (ref 0.1–1)
NON-UH HIE MPV: 8.2 FL (ref 7.4–10.4)
NON-UH HIE NA: 142 MMOL/L (ref 135–145)
NON-UH HIE NEUTROPHIL %: 54.6 %
NON-UH HIE NEUTROPHIL COUNT (ANC): 2.29 X1000 (ref 1.4–8.8)
NON-UH HIE NUCLEATED RBC: 0 /100WBC
NON-UH HIE PLATELET: 167 X10 (ref 150–450)
NON-UH HIE RBC: 4.77 X10 (ref 4.2–5.4)
NON-UH HIE RDW: 13.7 % (ref 11.5–14.5)
NON-UH HIE TOTAL CHOL/HDL CHOL RATIO: 2.9
NON-UH HIE TOTAL PROTEIN: 6.5 G/DL (ref 5.7–8.2)
NON-UH HIE TRIGLYCERIDES: 213 MG/DL (ref 30–150)
NON-UH HIE TSH: 1.64 UIU/ML (ref 0.55–4.78)
NON-UH HIE WBC: 4.2 X10 (ref 4.5–11)

## 2024-06-04 ENCOUNTER — OFFICE VISIT (OUTPATIENT)
Dept: PRIMARY CARE | Facility: CLINIC | Age: 79
End: 2024-06-04
Payer: MEDICARE

## 2024-06-04 VITALS
OXYGEN SATURATION: 95 % | HEIGHT: 59 IN | BODY MASS INDEX: 31.85 KG/M2 | WEIGHT: 158 LBS | DIASTOLIC BLOOD PRESSURE: 84 MMHG | SYSTOLIC BLOOD PRESSURE: 136 MMHG | TEMPERATURE: 98.6 F | HEART RATE: 75 BPM

## 2024-06-04 DIAGNOSIS — J40 BRONCHITIS: ICD-10-CM

## 2024-06-04 DIAGNOSIS — Z00.00 ROUTINE GENERAL MEDICAL EXAMINATION AT HEALTH CARE FACILITY: Primary | ICD-10-CM

## 2024-06-04 PROCEDURE — 1159F MED LIST DOCD IN RCRD: CPT | Performed by: INTERNAL MEDICINE

## 2024-06-04 PROCEDURE — G0439 PPPS, SUBSEQ VISIT: HCPCS | Performed by: INTERNAL MEDICINE

## 2024-06-04 PROCEDURE — 99214 OFFICE O/P EST MOD 30 MIN: CPT | Performed by: INTERNAL MEDICINE

## 2024-06-04 PROCEDURE — 99397 PER PM REEVAL EST PAT 65+ YR: CPT | Performed by: INTERNAL MEDICINE

## 2024-06-04 PROCEDURE — 3079F DIAST BP 80-89 MM HG: CPT | Performed by: INTERNAL MEDICINE

## 2024-06-04 PROCEDURE — 1160F RVW MEDS BY RX/DR IN RCRD: CPT | Performed by: INTERNAL MEDICINE

## 2024-06-04 PROCEDURE — 1036F TOBACCO NON-USER: CPT | Performed by: INTERNAL MEDICINE

## 2024-06-04 PROCEDURE — 1123F ACP DISCUSS/DSCN MKR DOCD: CPT | Performed by: INTERNAL MEDICINE

## 2024-06-04 PROCEDURE — 3075F SYST BP GE 130 - 139MM HG: CPT | Performed by: INTERNAL MEDICINE

## 2024-06-04 PROCEDURE — 1170F FXNL STATUS ASSESSED: CPT | Performed by: INTERNAL MEDICINE

## 2024-06-04 RX ORDER — DOXYCYCLINE 100 MG/1
100 CAPSULE ORAL 2 TIMES DAILY
Qty: 20 CAPSULE | Refills: 0 | Status: SHIPPED | OUTPATIENT
Start: 2024-06-04 | End: 2024-06-14

## 2024-06-04 ASSESSMENT — ACTIVITIES OF DAILY LIVING (ADL)
DRESSING: INDEPENDENT
DOING_HOUSEWORK: INDEPENDENT
TAKING_MEDICATION: INDEPENDENT
GROCERY_SHOPPING: INDEPENDENT
MANAGING_FINANCES: INDEPENDENT
BATHING: INDEPENDENT

## 2024-06-04 ASSESSMENT — LIFESTYLE VARIABLES
HOW OFTEN DO YOU HAVE A DRINK CONTAINING ALCOHOL: NEVER
SKIP TO QUESTIONS 9-10: 1
HOW OFTEN DO YOU HAVE SIX OR MORE DRINKS ON ONE OCCASION: NEVER
AUDIT-C TOTAL SCORE: 0
HOW MANY STANDARD DRINKS CONTAINING ALCOHOL DO YOU HAVE ON A TYPICAL DAY: PATIENT DOES NOT DRINK

## 2024-06-04 ASSESSMENT — ENCOUNTER SYMPTOMS
OCCASIONAL FEELINGS OF UNSTEADINESS: 0
DEPRESSION: 0
LOSS OF SENSATION IN FEET: 0

## 2024-06-04 ASSESSMENT — PATIENT HEALTH QUESTIONNAIRE - PHQ9
2. FEELING DOWN, DEPRESSED OR HOPELESS: NOT AT ALL
SUM OF ALL RESPONSES TO PHQ9 QUESTIONS 1 AND 2: 0
1. LITTLE INTEREST OR PLEASURE IN DOING THINGS: NOT AT ALL

## 2024-06-04 NOTE — PROGRESS NOTES
"Subjective   Reason for Visit: Kaity Rodney is an 79 y.o. female here for a Medicare Wellness visit.     Past Medical, Surgical, and Family History reviewed and updated in chart.    Reviewed all medications by prescribing practitioner or clinical pharmacist (such as prescriptions, OTCs, herbal therapies and supplements) and documented in the medical record.    [facility]    Patient Care Team:  Zakiya Burr MD as PCP - General (Pediatrics)  Zakiya Burr MD as PCP - [facility] ACO Attributed Provider     Patient is here for annual check-up    Last well check 2/2023    Reported health not bad    Dental  check  regular visits     Vision check regular visits   Vision issues none  Hearing issues none  Vaccines UTD  declines COVID and tetanus shots, otherwise UTD    Diet: good, balanced diet  Exercise: back exercises every morning, walks a lot of stairs every day  Caffeine 2-3 cups of coffee a day  Alcohol none  Tobacco never smoker    Colon cancer screening  Cologuard 4/2024 negative    Current issues:   Had COVID around a month ago. Has been coughing up yellow mucus throughout the day. Hard to tell if it is an asthma attack. Barely uses albuterol partly because the inhaler is far away. Able to complete her daily activities.    BP borderline, runs around the same at home, but doesn't measure consistently.    Noticed more significant fluid build-up, particularly in the ankles, follows with cardiology. Water pill regimen changed recently by cardiologist.    Reviewed lab work: >213, HDL 68>61, counseled on regular exercise and increasing daily fiber, decreasing sugar intake.    Review of Systems  10 point review of systems was performed and is otherwise negative except as noted in HPI.       Objective   Vitals:  /84   Pulse 75   Temp 37 °C (98.6 °F) (Oral)   Ht 1.499 m (4' 11\")   Wt 71.7 kg (158 lb)   SpO2 95%   BMI 31.91 kg/m²       Physical Exam  GEN: pleasant, well appearing  EYES: PERRLA, EOMI  CV: RRR, " no m/r/g  PULM: CTAB  ABD: soft, nontender, nondistended  NEURO: CN 2-12 grossly intact  PSYCH: appropriate mood and affect  MSK: no joint swelling grossly noted  EXT: 2+ bilateral ankle edema, 1+ up to the knees  SKIN: warm and dry, no lesions grossly noted      Assessment/Plan   Problem List Items Addressed This Visit    None  Visit Diagnoses       Routine general medical examination at health care facility    -  Primary    Bronchitis        Relevant Medications    doxycycline (Monodox) 100 mg capsule          -Increased sputum following recent COVID infection, prescribed 10 day course of doxycycline for bronchitis. Using advair daily, counseled on using albuterol PRN for shortness of breath   -Counseled on diet, routine exercise for HLD  -Continue to follow with cardiology for fluid overload. Asked for regular BP measurements at home  -Declines mammogram, last cologuard 4/2024 negative. Declines additional vaccinations    RTC in 6 months.    Patient seen and discussed with Dr. Burr

## 2024-06-25 DIAGNOSIS — J30.2 SEASONAL ALLERGIC RHINITIS, UNSPECIFIED TRIGGER: Primary | ICD-10-CM

## 2024-06-25 RX ORDER — AZELASTINE 1 MG/ML
1 SPRAY, METERED NASAL 2 TIMES DAILY
Qty: 90 ML | Refills: 0 | Status: SHIPPED | OUTPATIENT
Start: 2024-06-25

## 2024-09-23 DIAGNOSIS — J45.909 ASTHMA, UNSPECIFIED ASTHMA SEVERITY, UNSPECIFIED WHETHER COMPLICATED, UNSPECIFIED WHETHER PERSISTENT (HHS-HCC): ICD-10-CM

## 2024-09-23 RX ORDER — FLUTICASONE PROPIONATE AND SALMETEROL 250; 50 UG/1; UG/1
1 POWDER RESPIRATORY (INHALATION) EVERY 12 HOURS SCHEDULED
Qty: 180 EACH | Refills: 0 | Status: SHIPPED | OUTPATIENT
Start: 2024-09-23

## 2024-10-02 DIAGNOSIS — B37.9 CANDIDIASIS: ICD-10-CM

## 2024-10-02 RX ORDER — NYSTATIN 100000 [USP'U]/G
1 POWDER TOPICAL 2 TIMES DAILY
Qty: 90 G | Refills: 0 | Status: SHIPPED | OUTPATIENT
Start: 2024-10-02

## 2024-10-02 NOTE — TELEPHONE ENCOUNTER
Spoke w/patient, needs refill on  Nystatin 100,000 unit powder  [facility]  Last appt. 6/4/24  Upcoming appt. 12/4/24  Last BW 6/3/24    *Requesting 90 day supply for insurance to cover per [facility]*

## 2024-11-14 ENCOUNTER — APPOINTMENT (OUTPATIENT)
Dept: ALLERGY | Facility: CLINIC | Age: 79
End: 2024-11-14
Payer: MEDICARE

## 2024-11-14 VITALS — OXYGEN SATURATION: 95 % | BODY MASS INDEX: 31.51 KG/M2 | HEART RATE: 64 BPM | WEIGHT: 156 LBS

## 2024-11-14 DIAGNOSIS — J45.20 MILD INTERMITTENT ASTHMA WITHOUT COMPLICATION (HHS-HCC): Primary | ICD-10-CM

## 2024-11-14 DIAGNOSIS — J45.20 MILD INTERMITTENT REACTIVE AIRWAY DISEASE WITHOUT COMPLICATION (HHS-HCC): ICD-10-CM

## 2024-11-14 LAB
FEV1/FVC: 115 %
FEV1: 102 LITERS
FVC: 88 LITERS

## 2024-11-14 PROCEDURE — 96160 PT-FOCUSED HLTH RISK ASSMT: CPT | Performed by: ALLERGY & IMMUNOLOGY

## 2024-11-14 PROCEDURE — 99214 OFFICE O/P EST MOD 30 MIN: CPT | Performed by: ALLERGY & IMMUNOLOGY

## 2024-11-14 PROCEDURE — 94375 RESPIRATORY FLOW VOLUME LOOP: CPT | Performed by: ALLERGY & IMMUNOLOGY

## 2024-11-14 PROCEDURE — 1160F RVW MEDS BY RX/DR IN RCRD: CPT | Performed by: ALLERGY & IMMUNOLOGY

## 2024-11-14 PROCEDURE — 1159F MED LIST DOCD IN RCRD: CPT | Performed by: ALLERGY & IMMUNOLOGY

## 2024-11-14 PROCEDURE — 1123F ACP DISCUSS/DSCN MKR DOCD: CPT | Performed by: ALLERGY & IMMUNOLOGY

## 2024-11-14 RX ORDER — ALBUTEROL SULFATE 90 UG/1
1-2 INHALANT RESPIRATORY (INHALATION) EVERY 4 HOURS PRN
Qty: 8.5 G | Refills: 0 | Status: SHIPPED | OUTPATIENT
Start: 2024-11-14 | End: 2025-11-14

## 2024-11-14 RX ORDER — ALBUTEROL SULFATE 90 UG/1
1-2 INHALANT RESPIRATORY (INHALATION) EVERY 4 HOURS PRN
Qty: 8.5 G | Refills: 0 | Status: SHIPPED | OUTPATIENT
Start: 2024-11-14 | End: 2024-11-14 | Stop reason: SDUPTHER

## 2024-11-14 ASSESSMENT — ENCOUNTER SYMPTOMS
ROS GI COMMENTS: NO HEARTBURN.
COUGH: 1

## 2024-11-14 NOTE — ASSESSMENT & PLAN NOTE
ACT is 19.  She is requiring her albuterol every night for intermittent cough, attributed to the weather.      IO PFT looks good today.  Due to increased albuterol use and cough, I recommend she hold Wixela for 2 weeks.  She will start Trelegy one inhalation one time a day for 2  weeks and then restart the Wixela. I believe she is having an exacerbation due to illness. I am hopeful that a short term increase in her ICS and adding a LAMA will help.     She can continue albuterol as needed and for cough.    I recommend she have the RSV vaccine.     no

## 2024-11-14 NOTE — PATIENT INSTRUCTIONS
Hold Wixela for 2 weeks.    Start Trelegy one inhalation one time a day for 2  weeks.  Then restart the Wixela as used prior.    Use albuterol as needed and for cough.    I recommend you have the RSV vaccine.    Follow up in 3 months or sooner if symptoms worsen prior.

## 2024-11-14 NOTE — PROGRESS NOTES
"  Subjective   Patient ID:   [id number]   Kaity Rodney \"Nevin\" is a 79 y.o. female who presents for Allergic Rhinitis and Asthma (Follow up).    Chief Complaint   Patient presents with    Allergic Rhinitis    Asthma     Follow up      Patient presents for 6 month F/U of asthma.    Since last visit, 24, patient reports she has been well but has been using her albuterol at least once a day, always at night.  She also takes it when she starts coughing, which is happening randomly.  She is not sure why but thinks it may be due to the weather changes.  She was not using the albuterol at all prior and it has .  She wants to know if she needs to have it refilled.      Patient denies heartburn or any other ill symptoms.    Patient states she is healthy otherwise.  She is asking if she and her  should have the RSV and where they can get it.      Review of Systems   Respiratory:  Positive for cough (random).    Gastrointestinal:         No heartburn.     Objective   Pulse 64   Wt 70.8 kg (156 lb)   SpO2 95%   BMI 31.51 kg/m²      Physical Exam  Constitutional:       Appearance: Normal appearance.   HENT:      Head: Normocephalic and atraumatic.      Right Ear: External ear normal. There is no impacted cerumen.      Left Ear: External ear normal. There is no impacted cerumen.      Nose: No congestion or rhinorrhea.   Eyes:      Extraocular Movements: Extraocular movements intact.      Conjunctiva/sclera: Conjunctivae normal.      Pupils: Pupils are equal, round, and reactive to light.   Cardiovascular:      Rate and Rhythm: Normal rate and regular rhythm.      Heart sounds: No murmur heard.     No friction rub. No gallop.   Pulmonary:      Effort: No respiratory distress.      Breath sounds: No wheezing, rhonchi or rales.   Skin:     General: Skin is warm and dry.   Neurological:      Mental Status: She is alert.   Psychiatric:         Mood and Affect: Mood normal.         Behavior: Behavior normal. "     Pulmonary Functions Testing Results:    FEV1   Date Value Ref Range Status   11/14/2024 102 liters Final     FVC   Date Value Ref Range Status   11/14/2024 88 liters Final     FEV1/FVC   Date Value Ref Range Status   11/14/2024 115 % Final      Assessment/Plan     Asthma ([facility])  ACT is 19.  She is requiring her albuterol every night for intermittent cough, attributed to the weather.      IO PFT looks good today.  Due to increased albuterol use and cough, I recommend she hold Wixela for 2 weeks.  She will start Trelegy one inhalation one time a day for 2  weeks and then restart the Wixela. I believe she is having an exacerbation due to illness. I am hopeful that a short term increase in her ICS and adding a LAMA will help.     She can continue albuterol as needed and for cough.    I recommend she have the RSV vaccine.    By signing my name below, I, Arya Garciaibmaría, attest that this documentation has been prepared under the direction and in the presence of Evelyn Turner MD.  All medical record entries made by the Scribe were at my direction and personally dictated by me. I have reviewed the chart and agree that the record accurately reflects my personal performance of the history, physical exam, discussion and plan.

## 2024-11-14 NOTE — LETTER
"2024     Zakiya Burr MD  [address]  [facility]  [facility] [address]    Patient: Nevin Rodney   YOB: 1945   Date of Visit: 2024       Dear Dr. Zakiya Burr MD:    Thank you for referring Nevin Rodney to me for evaluation. Below are my notes for this consultation.  If you have questions, please do not hesitate to call me. I look forward to following your patient along with you.       Sincerely,     Evelyn Turner MD      CC: No Recipients  ______________________________________________________________________________________      Subjective  Patient ID:   [id number]   Kaity Rodney \"Nevin\" is a 79 y.o. female who presents for Allergic Rhinitis and Asthma (Follow up).    Chief Complaint   Patient presents with   • Allergic Rhinitis   • Asthma     Follow up      Patient presents for 6 month F/U of asthma.    Since last visit, 24, patient reports she has been well but has been using her albuterol at least once a day, always at night.  She also takes it when she starts coughing, which is happening randomly.  She is not sure why but thinks it may be due to the weather changes.  She was not using the albuterol at all prior and it has .  She wants to know if she needs to have it refilled.      Patient denies heartburn or any other ill symptoms.    Patient states she is healthy otherwise.  She is asking if she and her  should have the RSV and where they can get it.      Review of Systems   Respiratory:  Positive for cough (random).    Gastrointestinal:         No heartburn.     Objective  Pulse 64   Wt 70.8 kg (156 lb)   SpO2 95%   BMI 31.51 kg/m²      Physical Exam  Constitutional:       Appearance: Normal appearance.   HENT:      Head: Normocephalic and atraumatic.      Right Ear: External ear normal. There is no impacted cerumen.      Left Ear: External ear normal. There is no impacted cerumen.      Nose: No congestion or " rhinorrhea.   Eyes:      Extraocular Movements: Extraocular movements intact.      Conjunctiva/sclera: Conjunctivae normal.      Pupils: Pupils are equal, round, and reactive to light.   Cardiovascular:      Rate and Rhythm: Normal rate and regular rhythm.      Heart sounds: No murmur heard.     No friction rub. No gallop.   Pulmonary:      Effort: No respiratory distress.      Breath sounds: No wheezing, rhonchi or rales.   Skin:     General: Skin is warm and dry.   Neurological:      Mental Status: She is alert.   Psychiatric:         Mood and Affect: Mood normal.         Behavior: Behavior normal.     Pulmonary Functions Testing Results:    FEV1   Date Value Ref Range Status   11/14/2024 102 liters Final     FVC   Date Value Ref Range Status   11/14/2024 88 liters Final     FEV1/FVC   Date Value Ref Range Status   11/14/2024 115 % Final      Assessment/Plan    Asthma ([facility])  ACT is 19.  She is requiring her albuterol every night for intermittent cough, attributed to the weather.      IO PFT looks good today.  Due to increased albuterol use and cough, I recommend she hold Wixela for 2 weeks.  She will start Trelegy one inhalation one time a day for 2  weeks and then restart the Wixela. I believe she is having an exacerbation due to illness. I am hopeful that a short term increase in her ICS and adding a LAMA will help.     She can continue albuterol as needed and for cough.    I recommend she have the RSV vaccine.    By signing my name below, I, Mandeep Garcia, attest that this documentation has been prepared under the direction and in the presence of Evelyn Turner MD.  All medical record entries made by the Scribe were at my direction and personally dictated by me. I have reviewed the chart and agree that the record accurately reflects my personal performance of the history, physical exam, discussion and plan.

## 2024-12-02 DIAGNOSIS — Z00.00 ROUTINE GENERAL MEDICAL EXAMINATION AT A HEALTH CARE FACILITY: ICD-10-CM

## 2024-12-02 DIAGNOSIS — E78.00 HYPERCHOLESTEROLEMIA: ICD-10-CM

## 2024-12-02 DIAGNOSIS — I10 BENIGN ESSENTIAL HYPERTENSION: ICD-10-CM

## 2024-12-04 ENCOUNTER — APPOINTMENT (OUTPATIENT)
Dept: PRIMARY CARE | Facility: CLINIC | Age: 79
End: 2024-12-04
Payer: MEDICARE

## 2024-12-04 VITALS
HEIGHT: 59 IN | SYSTOLIC BLOOD PRESSURE: 132 MMHG | HEART RATE: 70 BPM | OXYGEN SATURATION: 93 % | TEMPERATURE: 98.1 F | DIASTOLIC BLOOD PRESSURE: 74 MMHG | WEIGHT: 150 LBS | BODY MASS INDEX: 30.24 KG/M2

## 2024-12-04 DIAGNOSIS — I25.10 ARTERIOSCLEROSIS OF CORONARY ARTERY: ICD-10-CM

## 2024-12-04 DIAGNOSIS — M48.061 SPINAL STENOSIS OF LUMBAR REGION, UNSPECIFIED WHETHER NEUROGENIC CLAUDICATION PRESENT: ICD-10-CM

## 2024-12-04 DIAGNOSIS — B37.9 CANDIDIASIS: ICD-10-CM

## 2024-12-04 DIAGNOSIS — R53.83 OTHER FATIGUE: ICD-10-CM

## 2024-12-04 DIAGNOSIS — I10 BENIGN ESSENTIAL HYPERTENSION: Primary | ICD-10-CM

## 2024-12-04 PROCEDURE — 1160F RVW MEDS BY RX/DR IN RCRD: CPT | Performed by: INTERNAL MEDICINE

## 2024-12-04 PROCEDURE — 1036F TOBACCO NON-USER: CPT | Performed by: INTERNAL MEDICINE

## 2024-12-04 PROCEDURE — 1159F MED LIST DOCD IN RCRD: CPT | Performed by: INTERNAL MEDICINE

## 2024-12-04 PROCEDURE — 1123F ACP DISCUSS/DSCN MKR DOCD: CPT | Performed by: INTERNAL MEDICINE

## 2024-12-04 PROCEDURE — 3075F SYST BP GE 130 - 139MM HG: CPT | Performed by: INTERNAL MEDICINE

## 2024-12-04 PROCEDURE — 1158F ADVNC CARE PLAN TLK DOCD: CPT | Performed by: INTERNAL MEDICINE

## 2024-12-04 PROCEDURE — G2211 COMPLEX E/M VISIT ADD ON: HCPCS | Performed by: INTERNAL MEDICINE

## 2024-12-04 PROCEDURE — 3078F DIAST BP <80 MM HG: CPT | Performed by: INTERNAL MEDICINE

## 2024-12-04 PROCEDURE — 99213 OFFICE O/P EST LOW 20 MIN: CPT | Performed by: INTERNAL MEDICINE

## 2024-12-04 RX ORDER — CLOTRIMAZOLE AND BETAMETHASONE DIPROPIONATE 10; .64 MG/G; MG/G
1 CREAM TOPICAL 2 TIMES DAILY
Qty: 45 G | Refills: 1 | Status: SHIPPED | OUTPATIENT
Start: 2024-12-04

## 2024-12-04 ASSESSMENT — PATIENT HEALTH QUESTIONNAIRE - PHQ9
1. LITTLE INTEREST OR PLEASURE IN DOING THINGS: NOT AT ALL
SUM OF ALL RESPONSES TO PHQ9 QUESTIONS 1 AND 2: 0
2. FEELING DOWN, DEPRESSED OR HOPELESS: NOT AT ALL

## 2024-12-04 ASSESSMENT — ENCOUNTER SYMPTOMS
OCCASIONAL FEELINGS OF UNSTEADINESS: 0
LOSS OF SENSATION IN FEET: 0
DEPRESSION: 0

## 2024-12-04 NOTE — PROGRESS NOTES
"Subjective   Patient ID: Kaity Rodney \"Nevin\" is a 79 y.o. female who presents for Follow-up (EP.  Follow up cholesterol.  Labs not done.  No concerns.).  HPI  Patient presents today for a follow-up of hypertension and hyperlipidemia.  She follows with cardiology Dr. CORDERO at [facility] and he takes care of her refills.  She does need a refill on her antifungal medicine for rashes that she gets under her breasts  She sees pain management for refills on her gabapentin she has low back pain and degenerative disc disease she denies chest pains headaches dizziness lightheadedness or shortness of breath and she has no lower extremity edema she is otherwise been feeling well  She has been compliant with her medications and works on diet and exercise  She had a trigger finger release on her left hand and is awaiting stitch removal    Review of Systems  Review of systems was performed and is otherwise negative except as noted in HPI.      Objective   /74   Pulse 70   Temp 36.7 °C (98.1 °F) (Oral)   Ht 1.499 m (4' 11\")   Wt 68 kg (150 lb)   SpO2 93%   BMI 30.30 kg/m²      Physical Exam  HEENT is normal  Lungs clear bilaterally  Heart is regular rate rhythm no murmurs  Abdomen benign  Lower extremities no edema     Assessment/Plan   Diagnoses and all orders for this visit:  Benign essential hypertension  -     Comprehensive Metabolic Panel; Future  -     Lipid Panel; Future  Candidiasis  -     clotrimazole-betamethasone (Lotrisone) cream; Apply 1 Application topically 2 times a day.  Arteriosclerosis of coronary artery  -     Lipid Panel; Future  Spinal stenosis of lumbar region, unspecified whether neurogenic claudication present  Other fatigue  -     CBC and Auto Differential; Future  -     TSH with reflex to Free T4 if abnormal; Future    Blood work is ordered  All blood work was reviewed and she did not get her blood work for this appointment yet she will have to go get that done  Refills are sent  She will " follow-up with cardiology  She will follow-up with pain management  Call with issues  Zakiya Burr MD

## 2025-01-08 DIAGNOSIS — J45.909 ASTHMA, UNSPECIFIED ASTHMA SEVERITY, UNSPECIFIED WHETHER COMPLICATED, UNSPECIFIED WHETHER PERSISTENT (HHS-HCC): ICD-10-CM

## 2025-01-08 RX ORDER — FLUTICASONE PROPIONATE AND SALMETEROL 250; 50 UG/1; UG/1
1 POWDER RESPIRATORY (INHALATION) EVERY 12 HOURS SCHEDULED
Qty: 180 EACH | Refills: 0 | Status: SHIPPED | OUTPATIENT
Start: 2025-01-08

## 2025-02-19 NOTE — PATIENT INSTRUCTIONS
Continue Wixela.    Use albuterol as needed and for cough.    Start Incruse one inhalation one time a day.    Follow up in 3 months or sooner if symptoms worsen prior.    Sheath prepped and inserted in the left radial artery.

## 2025-02-19 NOTE — PROGRESS NOTES
"  Subjective   Patient ID:   [id number]   Kaity Rodney \"Nevin\" is a 79 y.o. female who presents for Asthma and Allergic Rhinitis (Follow up ACT 19).    Chief Complaint   Patient presents with    Asthma    Allergic Rhinitis     Follow up ACT 19     Patient presents for 3 month F/U of asthma.     Since last visit, 11-14-24, patient reports she feels well and asthma is controlled for the most part.  She goes out at night to the [employer] and she sometimes coughs later at home. She associates it with talking a lot. She denies any smoke exposure. This will cause nighttime waking about 1 to 2 times a night.  She uses her albuterol twice a week at night but not every day.  She reports Trelegy made no difference for her, so she continue Wixela. She has not required steroids since last here.    This morning, she woke up with her head \"spinning \" and she had vertigo.    Review of Systems   Respiratory:  Positive for cough (intermittent, persistent).    Neurological:  Positive for dizziness.     Objective   /60   Pulse 78   Wt 68.9 kg (152 lb)   SpO2 95%   BMI 30.70 kg/m²      Physical Exam  Constitutional:       Appearance: Normal appearance.   HENT:      Head: Normocephalic and atraumatic.      Right Ear: External ear normal. There is no impacted cerumen.      Left Ear: External ear normal. There is no impacted cerumen.      Nose: No congestion or rhinorrhea.   Eyes:      Extraocular Movements: Extraocular movements intact.      Conjunctiva/sclera: Conjunctivae normal.      Pupils: Pupils are equal, round, and reactive to light.   Cardiovascular:      Rate and Rhythm: Normal rate and regular rhythm.      Heart sounds: No murmur heard.     No friction rub. No gallop.   Pulmonary:      Effort: No respiratory distress.      Breath sounds: No wheezing, rhonchi or rales.   Skin:     General: Skin is warm and dry.   Neurological:      Mental Status: She is alert.   Psychiatric:         Mood and Affect: Mood normal. "         Behavior: Behavior normal.     Pulmonary Functions Testing Results:    FEV1   Date Value Ref Range Status   02/20/2025 110 liters Final     FVC   Date Value Ref Range Status   02/20/2025 87 liters Final     FEV1/FVC   Date Value Ref Range Status   02/20/2025 125 % Final      Assessment/Plan     Asthma ([facility])    ACT is 19.  IO PFT looks good.     She will continue Wixela and albuterol as needed and for cough. She is having some night time symptoms, but her PFT looks great. She didn't notice an improvement with Trelegy which has a LAMA and can help with cough. Therefore she will continue the Wixela BID and I will add on Incruse to try to help with her cough. It is possible that she may have some reflux that is contributing to her night time symptoms. She denies any symptoms of reflux. If her night time cough persists I would like her use her albuterol prior to bed and if she still continues to cough she may need to see a vocal cord specialist.      Start Incruse one inhalation one time a day.  Return in 3 months for recheck.    By signing my name below, I, Mandeep Garcia, attest that this documentation has been prepared under the direction and in the presence of Evelyn Turner MD.  All medical record entries made by the Scribe were at my direction and personally dictated by me. I have reviewed the chart and agree that the record accurately reflects my personal performance of the history, physical exam, discussion and plan.

## 2025-02-20 ENCOUNTER — APPOINTMENT (OUTPATIENT)
Dept: ALLERGY | Facility: CLINIC | Age: 80
End: 2025-02-20
Payer: MEDICARE

## 2025-02-20 VITALS
BODY MASS INDEX: 30.7 KG/M2 | OXYGEN SATURATION: 95 % | WEIGHT: 152 LBS | SYSTOLIC BLOOD PRESSURE: 110 MMHG | HEART RATE: 78 BPM | DIASTOLIC BLOOD PRESSURE: 60 MMHG

## 2025-02-20 DIAGNOSIS — J45.20 MILD INTERMITTENT ASTHMA WITHOUT COMPLICATION (HHS-HCC): Primary | ICD-10-CM

## 2025-02-20 DIAGNOSIS — J45.41 MODERATE PERSISTENT ASTHMA WITH ACUTE EXACERBATION (HHS-HCC): ICD-10-CM

## 2025-02-20 LAB
FEV1/FVC: 125 %
FEV1: 110 LITERS
FVC: 87 LITERS

## 2025-02-20 PROCEDURE — 96160 PT-FOCUSED HLTH RISK ASSMT: CPT | Performed by: ALLERGY & IMMUNOLOGY

## 2025-02-20 PROCEDURE — 3078F DIAST BP <80 MM HG: CPT | Performed by: ALLERGY & IMMUNOLOGY

## 2025-02-20 PROCEDURE — 94375 RESPIRATORY FLOW VOLUME LOOP: CPT | Performed by: ALLERGY & IMMUNOLOGY

## 2025-02-20 PROCEDURE — 99214 OFFICE O/P EST MOD 30 MIN: CPT | Performed by: ALLERGY & IMMUNOLOGY

## 2025-02-20 PROCEDURE — 1159F MED LIST DOCD IN RCRD: CPT | Performed by: ALLERGY & IMMUNOLOGY

## 2025-02-20 PROCEDURE — 1160F RVW MEDS BY RX/DR IN RCRD: CPT | Performed by: ALLERGY & IMMUNOLOGY

## 2025-02-20 PROCEDURE — 1123F ACP DISCUSS/DSCN MKR DOCD: CPT | Performed by: ALLERGY & IMMUNOLOGY

## 2025-02-20 PROCEDURE — 3074F SYST BP LT 130 MM HG: CPT | Performed by: ALLERGY & IMMUNOLOGY

## 2025-02-20 ASSESSMENT — ENCOUNTER SYMPTOMS
COUGH: 1
DIZZINESS: 1

## 2025-02-20 NOTE — ASSESSMENT & PLAN NOTE
ACT is 19.  IO PFT looks good.     She will continue Wixela and albuterol as needed and for cough. She is having some night time symptoms, but her PFT looks great. She didn't notice an improvement with Trelegy which has a LAMA and can help with cough. Therefore she will continue the Wixela BID and I will add on Incruse to try to help with her cough. It is possible that she may have some reflux that is contributing to her night time symptoms. She denies any symptoms of reflux. If her night time cough persists I would like her use her albuterol prior to bed and if she still continues to cough she may need to see a vocal cord specialist.      Start Incruse one inhalation one time a day.  Return in 3 months for recheck.

## 2025-03-26 DIAGNOSIS — J45.909 ASTHMA, UNSPECIFIED ASTHMA SEVERITY, UNSPECIFIED WHETHER COMPLICATED, UNSPECIFIED WHETHER PERSISTENT (HHS-HCC): ICD-10-CM

## 2025-03-26 RX ORDER — FLUTICASONE PROPIONATE AND SALMETEROL 250; 50 UG/1; UG/1
1 POWDER RESPIRATORY (INHALATION) EVERY 12 HOURS SCHEDULED
Qty: 180 EACH | Refills: 0 | Status: SHIPPED | OUTPATIENT
Start: 2025-03-26

## 2025-05-05 LAB
NON-UH HIE A/G RATIO: 1.1
NON-UH HIE ALB: 3.6 G/DL (ref 3.4–5)
NON-UH HIE ALK PHOS: 78 UNIT/L (ref 45–117)
NON-UH HIE BILIRUBIN, TOTAL: 0.8 MG/DL (ref 0.3–1.2)
NON-UH HIE BUN/CREAT RATIO: 10
NON-UH HIE BUN: 9 MG/DL (ref 9–23)
NON-UH HIE CALCIUM: 10.1 MG/DL (ref 8.7–10.4)
NON-UH HIE CALCULATED LDL CHOLESTEROL: 74 MG/DL (ref 60–130)
NON-UH HIE CALCULATED OSMOLALITY: 284 MOSM/KG (ref 275–295)
NON-UH HIE CHLORIDE: 100 MMOL/L (ref 98–107)
NON-UH HIE CHOLESTEROL: 173 MG/DL (ref 100–200)
NON-UH HIE CO2, VENOUS: 34 MMOL/L (ref 20–31)
NON-UH HIE CREATININE: 0.9 MG/DL (ref 0.5–0.8)
NON-UH HIE GFR AA: >60
NON-UH HIE GLOBULIN: 3.3 G/DL
NON-UH HIE GLOMERULAR FILTRATION RATE: >60 ML/MIN/1.73M?
NON-UH HIE GLUCOSE: 100 MG/DL (ref 74–106)
NON-UH HIE GOT: 25 UNIT/L (ref 15–37)
NON-UH HIE GPT: 13 UNIT/L (ref 10–49)
NON-UH HIE HDL CHOLESTEROL: 66 MG/DL (ref 40–60)
NON-UH HIE K: 3.3 MMOL/L (ref 3.5–5.1)
NON-UH HIE NA: 143 MMOL/L (ref 135–145)
NON-UH HIE TOTAL CHOL/HDL CHOL RATIO: 2.6
NON-UH HIE TOTAL PROTEIN: 6.9 G/DL (ref 5.7–8.2)
NON-UH HIE TRIGLYCERIDES: 163 MG/DL (ref 30–150)

## 2025-05-06 DIAGNOSIS — J30.2 SEASONAL ALLERGIC RHINITIS, UNSPECIFIED TRIGGER: Primary | ICD-10-CM

## 2025-05-06 RX ORDER — AZELASTINE 1 MG/ML
1 SPRAY, METERED NASAL 2 TIMES DAILY
Qty: 90 ML | Refills: 0 | Status: SHIPPED | OUTPATIENT
Start: 2025-05-06

## 2025-05-09 DIAGNOSIS — E87.6 HYPOKALEMIA: ICD-10-CM

## 2025-05-13 DIAGNOSIS — Z13.29 SCREENING FOR THYROID DISORDER: ICD-10-CM

## 2025-05-13 DIAGNOSIS — Z78.0 POSTMENOPAUSAL: ICD-10-CM

## 2025-05-13 DIAGNOSIS — J45.20 MILD INTERMITTENT ASTHMA WITHOUT COMPLICATION (HHS-HCC): ICD-10-CM

## 2025-05-13 DIAGNOSIS — I44.7 LBBB (LEFT BUNDLE BRANCH BLOCK): ICD-10-CM

## 2025-05-13 DIAGNOSIS — I10 PRIMARY HYPERTENSION: ICD-10-CM

## 2025-05-13 DIAGNOSIS — E78.00 HYPERCHOLESTEROLEMIA: ICD-10-CM

## 2025-05-13 DIAGNOSIS — I80.01 THROMBOPHLEBITIS OF SUPERFICIAL VEINS OF RIGHT LOWER EXTREMITY: ICD-10-CM

## 2025-05-13 DIAGNOSIS — I25.10 ARTERIOSCLEROSIS OF CORONARY ARTERY: ICD-10-CM

## 2025-05-13 DIAGNOSIS — Z13.1 SCREENING FOR DIABETES MELLITUS: ICD-10-CM

## 2025-05-13 DIAGNOSIS — Z00.00 ROUTINE GENERAL MEDICAL EXAMINATION AT HEALTH CARE FACILITY: ICD-10-CM

## 2025-05-22 ENCOUNTER — APPOINTMENT (OUTPATIENT)
Dept: ALLERGY | Facility: CLINIC | Age: 80
End: 2025-05-22
Payer: MEDICARE

## 2025-05-22 VITALS — HEIGHT: 59 IN | WEIGHT: 140 LBS | BODY MASS INDEX: 28.22 KG/M2 | OXYGEN SATURATION: 97 % | HEART RATE: 75 BPM

## 2025-05-22 DIAGNOSIS — J45.40 ASTHMA, WELL CONTROLLED, MODERATE PERSISTENT (HHS-HCC): Primary | ICD-10-CM

## 2025-05-22 PROCEDURE — 1160F RVW MEDS BY RX/DR IN RCRD: CPT | Performed by: ALLERGY & IMMUNOLOGY

## 2025-05-22 PROCEDURE — 96160 PT-FOCUSED HLTH RISK ASSMT: CPT | Performed by: ALLERGY & IMMUNOLOGY

## 2025-05-22 PROCEDURE — 99213 OFFICE O/P EST LOW 20 MIN: CPT | Performed by: ALLERGY & IMMUNOLOGY

## 2025-05-22 PROCEDURE — 1159F MED LIST DOCD IN RCRD: CPT | Performed by: ALLERGY & IMMUNOLOGY

## 2025-05-22 NOTE — PROGRESS NOTES
"  Subjective   Patient ID:   [id number]   Kaity Rodney \"Nevin\" is a 80 y.o. female who presents for Follow-up (Act 21).    Chief Complaint   Patient presents with    Follow-up     Act 21      Patient presents for 3 month Incruse recheck F/U of asthma.     Since last visit, 2-20-25, patient reports incruse was really expensive. She used it for one month and then stopped it.     She is doing very well. She denies any symptoms.       Objective   Pulse 75   Ht 1.499 m (4' 11\")   Wt 63.5 kg (140 lb)   SpO2 97%   BMI 28.28 kg/m²      Physical Exam  Constitutional:       Appearance: Normal appearance.   HENT:      Head: Normocephalic and atraumatic.      Right Ear: External ear normal.      Left Ear: External ear normal.   Eyes:      Extraocular Movements: Extraocular movements intact.      Conjunctiva/sclera: Conjunctivae normal.      Pupils: Pupils are equal, round, and reactive to light.   Cardiovascular:      Rate and Rhythm: Normal rate and regular rhythm.      Heart sounds: No murmur heard.     No friction rub. No gallop.   Pulmonary:      Effort: No respiratory distress.      Breath sounds: No wheezing, rhonchi or rales.   Skin:     General: Skin is warm and dry.   Neurological:      Mental Status: She is alert.   Psychiatric:         Mood and Affect: Mood normal.         Behavior: Behavior normal.       Assessment/Plan     Asthma ([facility])  ACT 21.    Controlled with Advair. Not using Incruse. She stopped the Advair when she stopped the incruse. This was incorrect and she was supposed to take them together. Nonetheless, she feels that they worked the same. She is currently doing well on Advair alone. I will restart Incruse if her symptoms are not under control with Advair alone. She may have to try Spiriva due to cost.       "

## 2025-05-29 NOTE — ASSESSMENT & PLAN NOTE
ACT 21.    Controlled with Advair. Not using Incruse. She stopped the Advair when she stopped the incruse. This was incorrect and she was supposed to take them together. Nonetheless, she feels that they worked the same. She is currently doing well on Advair alone. I will restart Incruse if her symptoms are not under control with Advair alone. She may have to try Spiriva due to cost.

## 2025-06-03 ENCOUNTER — APPOINTMENT (OUTPATIENT)
Dept: DERMATOLOGY | Facility: CLINIC | Age: 80
End: 2025-06-03
Payer: MEDICARE

## 2025-06-03 LAB
NON-UH HIE A/G RATIO: 1.1
NON-UH HIE ALB: 3.8 G/DL (ref 3.4–5)
NON-UH HIE ALK PHOS: 80 UNIT/L (ref 45–117)
NON-UH HIE BASO COUNT: 0.09 X1000 (ref 0–0.2)
NON-UH HIE BASOS %: 2.1 %
NON-UH HIE BILIRUBIN, TOTAL: 0.8 MG/DL (ref 0.3–1.2)
NON-UH HIE BUN/CREAT RATIO: 8.9
NON-UH HIE BUN: 8 MG/DL (ref 9–23)
NON-UH HIE CALCIUM: 10.3 MG/DL (ref 8.7–10.4)
NON-UH HIE CALCULATED LDL CHOLESTEROL: 78 MG/DL (ref 60–130)
NON-UH HIE CALCULATED OSMOLALITY: 283 MOSM/KG (ref 275–295)
NON-UH HIE CHLORIDE: 102 MMOL/L (ref 98–107)
NON-UH HIE CHOLESTEROL: 182 MG/DL (ref 100–200)
NON-UH HIE CO2, VENOUS: 32 MMOL/L (ref 20–31)
NON-UH HIE CREATININE: 0.9 MG/DL (ref 0.5–0.8)
NON-UH HIE DIFF?: ABNORMAL
NON-UH HIE EOS COUNT: 0.16 X1000 (ref 0–0.5)
NON-UH HIE EOSIN %: 3.7 %
NON-UH HIE GFR AA: >60
NON-UH HIE GLOBULIN: 3.3 G/DL
NON-UH HIE GLOMERULAR FILTRATION RATE: >60 ML/MIN/1.73M?
NON-UH HIE GLUCOSE: 98 MG/DL (ref 74–106)
NON-UH HIE GOT: 26 UNIT/L (ref 15–37)
NON-UH HIE GPT: 16 UNIT/L (ref 10–49)
NON-UH HIE HCT: 42.7 % (ref 36–46)
NON-UH HIE HDL CHOLESTEROL: 75 MG/DL (ref 40–60)
NON-UH HIE HGB A1C: 5.5 %
NON-UH HIE HGB: 14.6 G/DL (ref 12–16)
NON-UH HIE INSTR WBC: 4.4
NON-UH HIE K: 3.3 MMOL/L (ref 3.5–5.1)
NON-UH HIE LYMPH %: 25.5 %
NON-UH HIE LYMPH COUNT: 1.11 X1000 (ref 1.2–4.8)
NON-UH HIE MCH: 30.7 PG (ref 27–34)
NON-UH HIE MCHC: 34.1 G/DL (ref 32–37)
NON-UH HIE MCV: 90.1 FL (ref 80–100)
NON-UH HIE MONO %: 12.1 %
NON-UH HIE MONO COUNT: 0.53 X1000 (ref 0.1–1)
NON-UH HIE MPV: 8.4 FL (ref 7.4–10.4)
NON-UH HIE NA: 143 MMOL/L (ref 135–145)
NON-UH HIE NEUTROPHIL %: 56.7 %
NON-UH HIE NEUTROPHIL COUNT (ANC): 2.48 X1000 (ref 1.4–8.8)
NON-UH HIE NUCLEATED RBC: 0 /100WBC
NON-UH HIE PLATELET: 173 X10 (ref 150–450)
NON-UH HIE RBC: 4.75 X10 (ref 4.2–5.4)
NON-UH HIE RDW: 13.6 % (ref 11.5–14.5)
NON-UH HIE TOTAL CHOL/HDL CHOL RATIO: 2.4
NON-UH HIE TOTAL PROTEIN: 7.1 G/DL (ref 5.7–8.2)
NON-UH HIE TRIGLYCERIDES: 144 MG/DL (ref 30–150)
NON-UH HIE TSH: 0.92 UIU/ML (ref 0.55–4.78)
NON-UH HIE WBC: 4.4 X10 (ref 4.5–11)

## 2025-06-05 ENCOUNTER — APPOINTMENT (OUTPATIENT)
Dept: RADIOLOGY | Facility: CLINIC | Age: 80
End: 2025-06-05
Payer: MEDICARE

## 2025-06-06 ENCOUNTER — HOSPITAL ENCOUNTER (OUTPATIENT)
Dept: RADIOLOGY | Facility: CLINIC | Age: 80
Discharge: HOME | End: 2025-06-06
Payer: MEDICARE

## 2025-06-06 ENCOUNTER — APPOINTMENT (OUTPATIENT)
Dept: PRIMARY CARE | Facility: CLINIC | Age: 80
End: 2025-06-06
Payer: MEDICARE

## 2025-06-06 VITALS
DIASTOLIC BLOOD PRESSURE: 68 MMHG | HEART RATE: 60 BPM | HEIGHT: 59 IN | SYSTOLIC BLOOD PRESSURE: 122 MMHG | WEIGHT: 142 LBS | OXYGEN SATURATION: 96 % | TEMPERATURE: 98.3 F | BODY MASS INDEX: 28.63 KG/M2

## 2025-06-06 DIAGNOSIS — E78.00 HYPERCHOLESTEROLEMIA: ICD-10-CM

## 2025-06-06 DIAGNOSIS — Z78.0 POSTMENOPAUSAL: ICD-10-CM

## 2025-06-06 DIAGNOSIS — Z12.31 ENCOUNTER FOR SCREENING MAMMOGRAM FOR MALIGNANT NEOPLASM OF BREAST: ICD-10-CM

## 2025-06-06 DIAGNOSIS — I10 PRIMARY HYPERTENSION: ICD-10-CM

## 2025-06-06 DIAGNOSIS — Z00.00 ROUTINE GENERAL MEDICAL EXAMINATION AT HEALTH CARE FACILITY: Primary | ICD-10-CM

## 2025-06-06 PROCEDURE — 77080 DXA BONE DENSITY AXIAL: CPT

## 2025-06-06 ASSESSMENT — ACTIVITIES OF DAILY LIVING (ADL)
DOING_HOUSEWORK: INDEPENDENT
TAKING_MEDICATION: INDEPENDENT
DRESSING: INDEPENDENT
MANAGING_FINANCES: INDEPENDENT
GROCERY_SHOPPING: INDEPENDENT
BATHING: INDEPENDENT

## 2025-06-06 ASSESSMENT — LIFESTYLE VARIABLES
HOW OFTEN DO YOU HAVE SIX OR MORE DRINKS ON ONE OCCASION: NEVER
AUDIT-C TOTAL SCORE: 1
HOW MANY STANDARD DRINKS CONTAINING ALCOHOL DO YOU HAVE ON A TYPICAL DAY: 1 OR 2
SKIP TO QUESTIONS 9-10: 1
HOW OFTEN DO YOU HAVE A DRINK CONTAINING ALCOHOL: MONTHLY OR LESS

## 2025-06-06 ASSESSMENT — ENCOUNTER SYMPTOMS
DEPRESSION: 0
LOSS OF SENSATION IN FEET: 0
OCCASIONAL FEELINGS OF UNSTEADINESS: 0

## 2025-06-06 NOTE — PROGRESS NOTES
"Subjective   Reason for Visit: Kaity Rodney is an 80 y.o. female here for a Medicare Wellness visit.     Past Medical, Surgical, and Family History reviewed and updated in chart.    Reviewed all medications by prescribing practitioner or clinical pharmacist (such as prescriptions, OTCs, herbal therapies and supplements) and documented in the medical record.    HPI      History of Present Illness  Kaity Rodney \"Nevin\" is an 80-year-old female who presents for a Medicare checkup and bone density test.    She has no current issues and has recently completed blood work, which showed normal CBC but low potassium levels. She is currently taking one torsemide pill daily, reduced from two, and two klorcon (potassium chloride) pills daily.    She uses Wixela regularly for asthma management and takes gabapentin for arthritis in her back.    She experiences regular leg cramps but remains active, frequently going up and down stairs at home. No chest pain or breathing problems.    Her social history includes regular exercise through daily activities like climbing stairs. She drinks two cups of coffee daily, does not consume alcohol, and prefers unsweetened iced tea. She has never smoked.      Last well check  sees Dr Taylor  on  diuretics and  low potassium    Reported health  good     Dental check  reg     Vision check reg   Vision issues n  Hearing issues n  Vaccines UTD  y    Diet healthy   Exercise reg   Caffeine 2 c   Alcohol never   Tobacco never     Colon cancer screening  2024      Patient Care Team:  Zakiya Burr MD as PCP - General (Pediatrics)  Zakiya Burr MD as PCP - [facility] Attributed Provider  Evelyn Turner MD as Referring Physician (Allergy and Immunology)     Review of Systems  GENERAL - Denies fever, fatigue or chills  SKIN - Denies rash, new skin lesions, or change in moles  EYES - Denies blurred vision, or change in visual acuity  EARS - Denies ear pain, discharge, ringing, or " "difficulty hearing  NOSE - Denies nasal congestion, discharge, or bleeding  MOUTH - Denies sore throat, postnasal drip or painful/difficulty swallowing  NECK - Denies pain or swelling  RESPIRATORY - Denies shortness of breath, cough, wheezing  CARDIOVASCULAR - Denies palpitations, chest pain, orthopnea, peripheral edema, syncope or claudication  GASTROINTESTINAL - Denies nausea, vomiting, diarrhea, constipation, abdominal pain, melena and or bright red blood  GENITOURINARY - Denies dysuria, frequency of urination, urgency, or hesitancy  MUSCULOSKELETAL - Denies joint or muscle pain, or back pain  NEUROLOGICAL - Denies localized numbness, weakness, or tingling  PSYCHIATRIC - Denies depression, anxiety, substance abuse, suicidal or homicidal ideation  ENDOCRINE - Denies heat or cold intolerance, weight loss or gain, increasing thirst  HEMATO-IMMUNOLOGIC - Denies easy bruising, bleeding, oral ulcerations or recurrent infections    Objective   Vitals:  /68   Pulse 60   Temp 36.8 °C (98.3 °F) (Oral)   Ht 1.499 m (4' 11\")   Wt 64.4 kg (142 lb)   SpO2 96%   BMI 28.68 kg/m²       Physical Exam  CONSTITUTIONAL - well nourished, well developed, looks like stated age, in no acute distress, not ill-appearing, and not tired appearing  SKIN - normal skin color and pigmentation, normal skin turgor without rash, lesions, or nodules visualized  HEAD - no trauma, normocephalic  EYES - pupils are equal and reactive to light, extraocular muscles are intact, and normal external exam  ENT - TM's intact, no injection, no signs of infection, uvula midline, normal tongue movement and throat normal, no exudate, nasal passage without discharge and patent  NECK - supple without rigidity, no neck mass was observed, no thyromegaly or thyroid nodules  CHEST - clear to auscultation, no wheezing, no crackles and no rales, good effort  CARDIAC - regular rate and regular rhythm, no skipped beats, no murmur  ABDOMEN - no organomegaly, " soft, nontender, nondistended, normal bowel sounds, no guarding/rebound/rigidity, negative McBurney sign and negative Mejia sign  EXTREMITIES - no edema, no deformities  NEUROLOGICAL - normal gait, normal balance, normal motor, no ataxia, DTRs equal and symmetrical; alert, oriented and no focal signs  PSYCHIATRIC - alert, pleasant and cordial, age-appropriate  IMMUNOLOGIC - no cervical lymphadenopathy    Assessment/Plan   Diagnoses and all orders for this visit:  Routine general medical examination at health care facility  Primary hypertension  -     Lipid Panel; Future  -     Comprehensive Metabolic Panel; Future  Hypercholesterolemia  -     Lipid Panel; Future  -     Comprehensive Metabolic Panel; Future  Encounter for screening mammogram for malignant neoplasm of breast  -     BI mammo bilateral screening tomosynthesis; Future      Assessment & Plan  Hypokalemia  Persistent hypokalemia with potassium level at 3.3 mEq/L despite treatment with torsemide and potassium chloride (Chlorcon). The diuretic effect of torsemide contributes to potassium loss.  - Contact Dr. Richards to discuss potential adjustment of potassium supplementation or diuretic regimen.    Edema  Chronic edema managed with torsemide. Diuretic regimen contributes to hypokalemia.  - Continue current diuretic regimen and discuss potential adjustments with Dr. Dexter.    Asthma  Asthma managed with regular use of Wixela. No acute exacerbations reported.  - Continue Wixela as prescribed by Dr. Turner.    Arthritis of the back  Chronic arthritis of the back managed with gabapentin. No new symptoms reported.  - Continue gabapentin as prescribed by Dr. Harris.    General Health Maintenance  Pneumonia vaccinations received. Regular dental and eye exams. Mammogram due as it has been two years since the last one.  - Order mammogram.    Fu 6 mos   Call w issues            This medical note was created with the assistance of artificial intelligence (AI)  for documentation purposes. The content has been reviewed and confirmed by the healthcare provider for accuracy and completeness. Patient consented to the use of audio recording and use of AI during their visit.

## 2025-06-10 ENCOUNTER — APPOINTMENT (OUTPATIENT)
Dept: DERMATOLOGY | Facility: CLINIC | Age: 80
End: 2025-06-10
Payer: MEDICARE

## 2025-06-10 DIAGNOSIS — L82.1 SEBORRHEIC KERATOSIS: ICD-10-CM

## 2025-06-10 DIAGNOSIS — Z85.828 HISTORY OF NONMELANOMA SKIN CANCER: ICD-10-CM

## 2025-06-10 DIAGNOSIS — D22.9 NEVUS: Primary | ICD-10-CM

## 2025-06-10 DIAGNOSIS — L81.4 LENTIGO: ICD-10-CM

## 2025-06-10 DIAGNOSIS — Z12.83 SCREENING EXAM FOR SKIN CANCER: ICD-10-CM

## 2025-06-10 PROCEDURE — 99213 OFFICE O/P EST LOW 20 MIN: CPT | Performed by: NURSE PRACTITIONER

## 2025-06-10 NOTE — PROGRESS NOTES
Subjective     Nevin Rodney is a 80 y.o. female who presents for the following: Skin Check.     Review of Systems:  No other skin or systemic complaints other than what is documented elsewhere in the note.    The following portions of the chart were reviewed this encounter and updated as appropriate:       Skin Cancer History  Biopsy Log Book  No skin cancers from Specimen Tracking.    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    Past Surgical History:  Nevin Rodney  has a past surgical history that includes Incisional breast biopsy (04/02/2014); Colonoscopy (04/02/2014); Cardiac catheterization (02/25/2022); Carpal tunnel release (02/25/2022); Hernia repair (11/08/2016); and Trigger finger release.    Family History:  Patient family history includes Brain cancer in her sister; Cancer in her father; Dementia in her mother.    Social History:  Nevin Rodney  reports that she has never smoked. She has never been exposed to tobacco smoke. She has never used smokeless tobacco. She reports that she does not drink alcohol and does not use drugs.    Allergies:  Darvon [propoxyphene] and Amoxil [amoxicillin]    Current Medications / CAM's:  Current Medications[1]     Objective   Well appearing patient in no apparent distress; mood and affect are within normal limits.      Assessment/Plan   Skin Exam  1. NEVUS  Generalized  Uniform pigmented macule(s)/papule(s) with reassuring findings on dermoscopy  -Discussed nature of  condition  -Reassurance, benign-appearing features on examination today  -Recommend continued observation  2. LENTIGO  Generalized  Tan macules  -Benign appearing on exam  -Reassurance, recommend observation  3. SEBORRHEIC KERATOSIS  Generalized  Stuck on, waxy macule(s)/papule(s)/plaque(s) with comedo-like openings and milia like cysts  -Discussed nature of condition  -Reassurance, recommend continued observation  4. SCREENING EXAM FOR SKIN CANCER  Generalized  5. HISTORY OF NONMELANOMA SKIN CANCER (3)  Generalized, Left Nasal Sidewall, Mid Back  Personal History of Non-Melanoma Skin Cancer  -Well healed scar(s) with no evidence of recurrence  -Discussed the need for annual or semi-annual skin examinations and to return sooner if any new or changing lesions are noticed. Patient verbalizes understanding            [1]   Current Outpatient Medications:     albuterol (ProAir HFA) 90 mcg/actuation inhaler, Inhale 1-2 puffs every 4 hours if needed for wheezing or shortness of breath (cough)., Disp: 8.5 g, Rfl: 0    atenolol (Tenormin) 25 mg tablet, 1 tablet (25 mg) once daily., Disp: , Rfl:     azelastine (Astelin) 137 mcg (0.1 %) nasal spray, Administer 1 spray into each nostril 2 times a day. Use in each nostril as directed, Disp: 90 mL, Rfl: 0    calcium 500 mg calcium (1,250 mg) tablet, Take 1 tablet by mouth 2 times daily (morning and late afternoon)., Disp: , Rfl:     cholecalciferol (Vitamin D-3) 125 MCG (5000 UT) capsule, Take 1 capsule (125 mcg) by mouth once daily., Disp: , Rfl:     clotrimazole-betamethasone (Lotrisone) cream, Apply 1 Application topically 2 times a day., Disp: 45 g, Rfl: 1    Echinacea purpurea extract (echinacea) 125 mg tablet, Take by mouth., Disp: , Rfl:     esomeprazole (NexIUM) 20 mg DR capsule, Take 2 capsules (40 mg) by mouth once daily in the morning. Take before meals. Do not open capsule., Disp: , Rfl:     fluticasone (Flonase) 50 mcg/actuation nasal spray, Administer 2 sprays into  each nostril once daily as needed for rhinitis (ear congestion). Shake gently. Before first use, prime pump. After use, clean tip and replace cap., Disp: , Rfl:     gabapentin (Neurontin) 300 mg capsule, Take 1 capsule (300 mg) by mouth 2 times a day., Disp: , Rfl:     loratadine (Claritin) 10 mg tablet, Take 1 tablet (10 mg) by mouth once daily., Disp: , Rfl:     losartan (Cozaar) 25 mg tablet, Take 1 tablet (25 mg) by mouth once daily., Disp: , Rfl:     nystatin (Mycostatin) 100,000 unit/gram powder, Apply 1 Application topically 2 times a day., Disp: 90 g, Rfl: 0    omega-3 acid ethyl esters (Lovaza) 1 gram capsule, Take 1 capsule (1 g) by mouth 2 times a day., Disp: , Rfl:     potassium chloride CR 20 mEq ER tablet, Take 1 tablet (20 mEq) by mouth once daily., Disp: 90 tablet, Rfl: 1    simvastatin (Zocor) 20 mg tablet, Take 1 tablet (20 mg) by mouth once daily at bedtime., Disp: , Rfl:     torsemide (Demadex) 20 mg tablet, Take 2 tablets (40 mg) by mouth once daily. (Patient taking differently: Take 2 tablets (40 mg) by mouth once daily. 1 tablet not 2), Disp: 60 tablet, Rfl: 0    umeclidinium (Incruse Ellipta) 62.5 mcg/actuation inhalation, Inhale 1 puff (62.5 mcg) once daily., Disp: 1 each, Rfl: 3    Wixela Inhub 250-50 mcg/dose diskus inhaler, USE 1 INHALATION ORALLY    EVERY 12 HOURS, Disp: 180 each, Rfl: 0

## 2025-06-17 DIAGNOSIS — J45.909 ASTHMA, UNSPECIFIED ASTHMA SEVERITY, UNSPECIFIED WHETHER COMPLICATED, UNSPECIFIED WHETHER PERSISTENT (HHS-HCC): ICD-10-CM

## 2025-06-17 RX ORDER — FLUTICASONE PROPIONATE AND SALMETEROL 250; 50 UG/1; UG/1
1 POWDER RESPIRATORY (INHALATION) EVERY 12 HOURS SCHEDULED
Qty: 180 EACH | Refills: 1 | Status: SHIPPED | OUTPATIENT
Start: 2025-06-17

## 2025-06-26 LAB
NON-UH HIE BUN/CREAT RATIO: 10
NON-UH HIE BUN: 8 MG/DL (ref 9–23)
NON-UH HIE CALCIUM: 10.3 MG/DL (ref 8.7–10.4)
NON-UH HIE CALCULATED OSMOLALITY: 285 MOSM/KG (ref 275–295)
NON-UH HIE CHLORIDE: 103 MMOL/L (ref 98–107)
NON-UH HIE CO2, VENOUS: 32 MMOL/L (ref 20–31)
NON-UH HIE CREATININE: 0.8 MG/DL (ref 0.5–0.8)
NON-UH HIE GFR AA: >60
NON-UH HIE GLOMERULAR FILTRATION RATE: >60 ML/MIN/1.73M?
NON-UH HIE GLUCOSE: 94 MG/DL (ref 74–106)
NON-UH HIE K: 3.8 MMOL/L (ref 3.5–5.1)
NON-UH HIE NA: 144 MMOL/L (ref 135–145)

## 2025-07-04 DIAGNOSIS — J30.2 SEASONAL ALLERGIC RHINITIS, UNSPECIFIED TRIGGER: ICD-10-CM

## 2025-07-07 RX ORDER — AZELASTINE 1 MG/ML
SPRAY, METERED NASAL
Qty: 90 ML | Refills: 1 | Status: SHIPPED | OUTPATIENT
Start: 2025-07-07

## 2025-08-16 LAB
NON-UH HIE A/G RATIO: 1.2
NON-UH HIE ALB: 3 G/DL (ref 3.4–5)
NON-UH HIE ALK PHOS: 84 UNIT/L (ref 45–117)
NON-UH HIE APTT PATIENT: 29.5 SECONDS (ref 26–36)
NON-UH HIE BASO COUNT: 0.06 X1000 (ref 0–0.2)
NON-UH HIE BASOS %: 0.5 %
NON-UH HIE BILIRUBIN, TOTAL: 0.7 MG/DL (ref 0.3–1.2)
NON-UH HIE BUN/CREAT RATIO: 15
NON-UH HIE BUN: 12 MG/DL (ref 9–23)
NON-UH HIE CALCIUM: 9.3 MG/DL (ref 8.7–10.4)
NON-UH HIE CALCULATED OSMOLALITY: 284 MOSM/KG (ref 275–295)
NON-UH HIE CHLORIDE: 107 MMOL/L (ref 98–107)
NON-UH HIE CO2, VENOUS: 27 MMOL/L (ref 20–31)
NON-UH HIE CREATININE: 0.8 MG/DL (ref 0.5–0.8)
NON-UH HIE DELTA TROPONIN 2 HR: 5 PG/ML (ref 0–14)
NON-UH HIE DIFF?: ABNORMAL
NON-UH HIE EOS COUNT: 0.16 X1000 (ref 0–0.5)
NON-UH HIE EOSIN %: 1.4 %
NON-UH HIE GFR AA: >60
NON-UH HIE GLOBULIN: 2.6 G/DL
NON-UH HIE GLOMERULAR FILTRATION RATE: >60 ML/MIN/1.73M?
NON-UH HIE GLUCOSE: 142 MG/DL (ref 74–106)
NON-UH HIE GOT: 58 UNIT/L (ref 15–37)
NON-UH HIE GPT: 42 UNIT/L (ref 10–49)
NON-UH HIE HCT: 39.4 % (ref 36–46)
NON-UH HIE HGB: 13.1 G/DL (ref 12–16)
NON-UH HIE INR: 1
NON-UH HIE INSTR WBC: 11.4
NON-UH HIE K: 4 MMOL/L (ref 3.5–5.1)
NON-UH HIE LYMPH %: 26.2 %
NON-UH HIE LYMPH COUNT: 2.98 X1000 (ref 1.2–4.8)
NON-UH HIE MCH: 30.2 PG (ref 27–34)
NON-UH HIE MCHC: 33.3 G/DL (ref 32–37)
NON-UH HIE MCV: 90.7 FL (ref 80–100)
NON-UH HIE MDW: 18.6 (ref 13.98–20)
NON-UH HIE MONO %: 8.6 %
NON-UH HIE MONO COUNT: 0.98 X1000 (ref 0.1–1)
NON-UH HIE MPV: 7.7 FL (ref 7.4–10.4)
NON-UH HIE NA: 141 MMOL/L (ref 135–145)
NON-UH HIE NEUTROPHIL %: 63.3 %
NON-UH HIE NEUTROPHIL COUNT (ANC): 7.2 X1000 (ref 1.4–8.8)
NON-UH HIE NUCLEATED RBC: 0 /100WBC
NON-UH HIE PLATELET: 175 X10 (ref 150–450)
NON-UH HIE PROTIME PATIENT: 11.4 SECONDS (ref 9.8–12.4)
NON-UH HIE RBC: 4.34 X10 (ref 4.2–5.4)
NON-UH HIE RDW: 13.8 % (ref 11.5–14.5)
NON-UH HIE TOTAL PROTEIN: 5.6 G/DL (ref 5.7–8.2)
NON-UH HIE TROPONIN HS 0 HR: 5 PG/ML (ref 3–34)
NON-UH HIE TROPONIN HS 2 HR: 10 PG/ML (ref 3–34)
NON-UH HIE WBC: 11.4 X10 (ref 4.5–11)

## 2025-09-03 ENCOUNTER — PATIENT OUTREACH (OUTPATIENT)
Dept: PRIMARY CARE | Facility: CLINIC | Age: 80
End: 2025-09-03
Payer: MEDICARE

## 2025-09-30 ENCOUNTER — APPOINTMENT (OUTPATIENT)
Dept: NEUROSURGERY | Facility: CLINIC | Age: 80
End: 2025-09-30
Payer: MEDICARE

## 2025-11-20 ENCOUNTER — APPOINTMENT (OUTPATIENT)
Dept: ALLERGY | Facility: CLINIC | Age: 80
End: 2025-11-20
Payer: MEDICARE

## 2025-12-05 ENCOUNTER — APPOINTMENT (OUTPATIENT)
Dept: PRIMARY CARE | Facility: CLINIC | Age: 80
End: 2025-12-05
Payer: MEDICARE

## 2026-06-11 ENCOUNTER — APPOINTMENT (OUTPATIENT)
Dept: DERMATOLOGY | Facility: CLINIC | Age: 81
End: 2026-06-11
Payer: MEDICARE